# Patient Record
Sex: MALE | Race: WHITE | NOT HISPANIC OR LATINO | Employment: FULL TIME | URBAN - METROPOLITAN AREA
[De-identification: names, ages, dates, MRNs, and addresses within clinical notes are randomized per-mention and may not be internally consistent; named-entity substitution may affect disease eponyms.]

---

## 2017-01-18 ENCOUNTER — COMMUNICATION - HEALTHEAST (OUTPATIENT)
Dept: INTERNAL MEDICINE | Facility: CLINIC | Age: 62
End: 2017-01-18

## 2017-01-19 ENCOUNTER — OFFICE VISIT - HEALTHEAST (OUTPATIENT)
Dept: INTERNAL MEDICINE | Facility: CLINIC | Age: 62
End: 2017-01-19

## 2017-01-19 DIAGNOSIS — K62.5 RECTAL BLEEDING: ICD-10-CM

## 2017-01-19 ASSESSMENT — MIFFLIN-ST. JEOR: SCORE: 1558.86

## 2017-04-07 ENCOUNTER — COMMUNICATION - HEALTHEAST (OUTPATIENT)
Dept: INTERNAL MEDICINE | Facility: CLINIC | Age: 62
End: 2017-04-07

## 2017-10-14 ENCOUNTER — COMMUNICATION - HEALTHEAST (OUTPATIENT)
Dept: INTERNAL MEDICINE | Facility: CLINIC | Age: 62
End: 2017-10-14

## 2017-10-14 DIAGNOSIS — E78.5 HYPERLIPIDEMIA: ICD-10-CM

## 2017-10-24 ENCOUNTER — RECORDS - HEALTHEAST (OUTPATIENT)
Dept: ADMINISTRATIVE | Facility: OTHER | Age: 62
End: 2017-10-24

## 2018-01-08 ENCOUNTER — COMMUNICATION - HEALTHEAST (OUTPATIENT)
Dept: INTERNAL MEDICINE | Facility: CLINIC | Age: 63
End: 2018-01-08

## 2018-01-08 DIAGNOSIS — E78.5 HYPERLIPIDEMIA: ICD-10-CM

## 2018-04-05 ENCOUNTER — COMMUNICATION - HEALTHEAST (OUTPATIENT)
Dept: INTERNAL MEDICINE | Facility: CLINIC | Age: 63
End: 2018-04-05

## 2018-04-05 DIAGNOSIS — E78.5 HYPERLIPIDEMIA: ICD-10-CM

## 2018-06-12 ENCOUNTER — COMMUNICATION - HEALTHEAST (OUTPATIENT)
Dept: INTERNAL MEDICINE | Facility: CLINIC | Age: 63
End: 2018-06-12

## 2018-06-12 DIAGNOSIS — E78.5 HYPERLIPIDEMIA: ICD-10-CM

## 2018-10-06 ENCOUNTER — COMMUNICATION - HEALTHEAST (OUTPATIENT)
Dept: INTERNAL MEDICINE | Facility: CLINIC | Age: 63
End: 2018-10-06

## 2018-10-06 DIAGNOSIS — E78.5 HYPERLIPIDEMIA: ICD-10-CM

## 2018-11-15 ENCOUNTER — OFFICE VISIT - HEALTHEAST (OUTPATIENT)
Dept: INTERNAL MEDICINE | Facility: CLINIC | Age: 63
End: 2018-11-15

## 2018-11-15 DIAGNOSIS — Z00.00 ROUTINE GENERAL MEDICAL EXAMINATION AT A HEALTH CARE FACILITY: ICD-10-CM

## 2018-11-15 DIAGNOSIS — E78.5 HYPERLIPIDEMIA: ICD-10-CM

## 2018-11-15 LAB
ALBUMIN SERPL-MCNC: 4.1 G/DL (ref 3.5–5)
ALBUMIN UR-MCNC: NEGATIVE MG/DL
ALP SERPL-CCNC: 105 U/L (ref 45–120)
ALT SERPL W P-5'-P-CCNC: 26 U/L (ref 0–45)
ANION GAP SERPL CALCULATED.3IONS-SCNC: 9 MMOL/L (ref 5–18)
APPEARANCE UR: CLEAR
AST SERPL W P-5'-P-CCNC: 31 U/L (ref 0–40)
BILIRUB SERPL-MCNC: 1.1 MG/DL (ref 0–1)
BILIRUB UR QL STRIP: NEGATIVE
BUN SERPL-MCNC: 24 MG/DL (ref 8–22)
CALCIUM SERPL-MCNC: 9.4 MG/DL (ref 8.5–10.5)
CHLORIDE BLD-SCNC: 103 MMOL/L (ref 98–107)
CHOLEST SERPL-MCNC: 220 MG/DL
CO2 SERPL-SCNC: 26 MMOL/L (ref 22–31)
COLOR UR AUTO: YELLOW
CREAT SERPL-MCNC: 0.98 MG/DL (ref 0.7–1.3)
ERYTHROCYTE [DISTWIDTH] IN BLOOD BY AUTOMATED COUNT: 11.3 % (ref 11–14.5)
FASTING STATUS PATIENT QL REPORTED: ABNORMAL
GFR SERPL CREATININE-BSD FRML MDRD: >60 ML/MIN/1.73M2
GLUCOSE BLD-MCNC: 94 MG/DL (ref 70–125)
GLUCOSE UR STRIP-MCNC: NEGATIVE MG/DL
HCT VFR BLD AUTO: 49.5 % (ref 40–54)
HDLC SERPL-MCNC: 50 MG/DL
HGB BLD-MCNC: 16.7 G/DL (ref 14–18)
HGB UR QL STRIP: NEGATIVE
KETONES UR STRIP-MCNC: ABNORMAL MG/DL
LDLC SERPL CALC-MCNC: 143 MG/DL
LEUKOCYTE ESTERASE UR QL STRIP: NEGATIVE
MCH RBC QN AUTO: 32 PG (ref 27–34)
MCHC RBC AUTO-ENTMCNC: 33.7 G/DL (ref 32–36)
MCV RBC AUTO: 95 FL (ref 80–100)
NITRATE UR QL: NEGATIVE
PH UR STRIP: 5.5 [PH] (ref 5–8)
PLATELET # BLD AUTO: 209 THOU/UL (ref 140–440)
PMV BLD AUTO: 7 FL (ref 7–10)
POTASSIUM BLD-SCNC: 3.9 MMOL/L (ref 3.5–5)
PROT SERPL-MCNC: 7.6 G/DL (ref 6–8)
PSA SERPL-MCNC: 1.2 NG/ML (ref 0–4.5)
RBC # BLD AUTO: 5.22 MILL/UL (ref 4.4–6.2)
SODIUM SERPL-SCNC: 138 MMOL/L (ref 136–145)
SP GR UR STRIP: >=1.03 (ref 1–1.03)
TRIGL SERPL-MCNC: 137 MG/DL
TSH SERPL DL<=0.005 MIU/L-ACNC: 2.63 UIU/ML (ref 0.3–5)
UROBILINOGEN UR STRIP-ACNC: ABNORMAL
WBC: 6 THOU/UL (ref 4–11)

## 2018-11-15 ASSESSMENT — MIFFLIN-ST. JEOR: SCORE: 1541.28

## 2018-11-16 ENCOUNTER — COMMUNICATION - HEALTHEAST (OUTPATIENT)
Dept: INTERNAL MEDICINE | Facility: CLINIC | Age: 63
End: 2018-11-16

## 2018-11-19 ENCOUNTER — AMBULATORY - HEALTHEAST (OUTPATIENT)
Dept: INTERNAL MEDICINE | Facility: CLINIC | Age: 63
End: 2018-11-19

## 2018-11-19 ENCOUNTER — COMMUNICATION - HEALTHEAST (OUTPATIENT)
Dept: INTERNAL MEDICINE | Facility: CLINIC | Age: 63
End: 2018-11-19

## 2018-11-19 DIAGNOSIS — E78.5 HYPERLIPIDEMIA: ICD-10-CM

## 2019-10-23 ENCOUNTER — COMMUNICATION - HEALTHEAST (OUTPATIENT)
Dept: INTERNAL MEDICINE | Facility: CLINIC | Age: 64
End: 2019-10-23

## 2019-10-31 ENCOUNTER — COMMUNICATION - HEALTHEAST (OUTPATIENT)
Dept: INTERNAL MEDICINE | Facility: CLINIC | Age: 64
End: 2019-10-31

## 2019-10-31 DIAGNOSIS — E78.5 HYPERLIPIDEMIA: ICD-10-CM

## 2019-12-23 ENCOUNTER — OFFICE VISIT - HEALTHEAST (OUTPATIENT)
Dept: INTERNAL MEDICINE | Facility: CLINIC | Age: 64
End: 2019-12-23

## 2019-12-23 DIAGNOSIS — Z00.00 ROUTINE GENERAL MEDICAL EXAMINATION AT A HEALTH CARE FACILITY: ICD-10-CM

## 2019-12-23 LAB
ALBUMIN SERPL-MCNC: 3.8 G/DL (ref 3.5–5)
ALBUMIN UR-MCNC: NEGATIVE MG/DL
ALP SERPL-CCNC: 109 U/L (ref 45–120)
ALT SERPL W P-5'-P-CCNC: 30 U/L (ref 0–45)
ANION GAP SERPL CALCULATED.3IONS-SCNC: 8 MMOL/L (ref 5–18)
APPEARANCE UR: CLEAR
AST SERPL W P-5'-P-CCNC: 26 U/L (ref 0–40)
BILIRUB SERPL-MCNC: 0.6 MG/DL (ref 0–1)
BILIRUB UR QL STRIP: NEGATIVE
BUN SERPL-MCNC: 22 MG/DL (ref 8–22)
CALCIUM SERPL-MCNC: 9.2 MG/DL (ref 8.5–10.5)
CHLORIDE BLD-SCNC: 107 MMOL/L (ref 98–107)
CHOLEST SERPL-MCNC: 164 MG/DL
CO2 SERPL-SCNC: 25 MMOL/L (ref 22–31)
COLOR UR AUTO: YELLOW
CREAT SERPL-MCNC: 1.02 MG/DL (ref 0.7–1.3)
ERYTHROCYTE [DISTWIDTH] IN BLOOD BY AUTOMATED COUNT: 11.2 % (ref 11–14.5)
FASTING STATUS PATIENT QL REPORTED: YES
GFR SERPL CREATININE-BSD FRML MDRD: >60 ML/MIN/1.73M2
GLUCOSE BLD-MCNC: 102 MG/DL (ref 70–125)
GLUCOSE UR STRIP-MCNC: NEGATIVE MG/DL
HCT VFR BLD AUTO: 45.3 % (ref 40–54)
HDLC SERPL-MCNC: 47 MG/DL
HGB BLD-MCNC: 15.7 G/DL (ref 14–18)
HGB UR QL STRIP: NEGATIVE
KETONES UR STRIP-MCNC: NEGATIVE MG/DL
LDLC SERPL CALC-MCNC: 89 MG/DL
LEUKOCYTE ESTERASE UR QL STRIP: NEGATIVE
MCH RBC QN AUTO: 32.4 PG (ref 27–34)
MCHC RBC AUTO-ENTMCNC: 34.7 G/DL (ref 32–36)
MCV RBC AUTO: 93 FL (ref 80–100)
NITRATE UR QL: NEGATIVE
PH UR STRIP: 5 [PH] (ref 5–8)
PLATELET # BLD AUTO: 190 THOU/UL (ref 140–440)
PMV BLD AUTO: 7.2 FL (ref 7–10)
POTASSIUM BLD-SCNC: 4.2 MMOL/L (ref 3.5–5)
PROT SERPL-MCNC: 7 G/DL (ref 6–8)
PSA SERPL-MCNC: 1.6 NG/ML (ref 0–4.5)
RBC # BLD AUTO: 4.85 MILL/UL (ref 4.4–6.2)
SODIUM SERPL-SCNC: 140 MMOL/L (ref 136–145)
SP GR UR STRIP: 1.02 (ref 1–1.03)
TRIGL SERPL-MCNC: 141 MG/DL
TSH SERPL DL<=0.005 MIU/L-ACNC: 2.73 UIU/ML (ref 0.3–5)
UROBILINOGEN UR STRIP-ACNC: NORMAL
WBC: 4.5 THOU/UL (ref 4–11)

## 2019-12-23 ASSESSMENT — MIFFLIN-ST. JEOR: SCORE: 1551.47

## 2019-12-24 ENCOUNTER — COMMUNICATION - HEALTHEAST (OUTPATIENT)
Dept: INTERNAL MEDICINE | Facility: CLINIC | Age: 64
End: 2019-12-24

## 2020-01-22 ENCOUNTER — RECORDS - HEALTHEAST (OUTPATIENT)
Dept: ADMINISTRATIVE | Facility: OTHER | Age: 65
End: 2020-01-22

## 2020-01-30 ENCOUNTER — COMMUNICATION - HEALTHEAST (OUTPATIENT)
Dept: INTERNAL MEDICINE | Facility: CLINIC | Age: 65
End: 2020-01-30

## 2020-01-30 DIAGNOSIS — E78.5 HYPERLIPIDEMIA: ICD-10-CM

## 2020-02-25 ENCOUNTER — RECORDS - HEALTHEAST (OUTPATIENT)
Dept: ADMINISTRATIVE | Facility: OTHER | Age: 65
End: 2020-02-25

## 2020-02-27 ENCOUNTER — RECORDS - HEALTHEAST (OUTPATIENT)
Dept: ADMINISTRATIVE | Facility: OTHER | Age: 65
End: 2020-02-27

## 2020-03-02 ENCOUNTER — RECORDS - HEALTHEAST (OUTPATIENT)
Dept: ADMINISTRATIVE | Facility: OTHER | Age: 65
End: 2020-03-02

## 2020-03-11 ENCOUNTER — COMMUNICATION - HEALTHEAST (OUTPATIENT)
Dept: INTERNAL MEDICINE | Facility: CLINIC | Age: 65
End: 2020-03-11

## 2020-06-18 ENCOUNTER — COMMUNICATION - HEALTHEAST (OUTPATIENT)
Dept: INTERNAL MEDICINE | Facility: CLINIC | Age: 65
End: 2020-06-18

## 2021-01-16 ENCOUNTER — COMMUNICATION - HEALTHEAST (OUTPATIENT)
Dept: INTERNAL MEDICINE | Facility: CLINIC | Age: 66
End: 2021-01-16

## 2021-01-18 ENCOUNTER — OFFICE VISIT - HEALTHEAST (OUTPATIENT)
Dept: INTERNAL MEDICINE | Facility: CLINIC | Age: 66
End: 2021-01-18

## 2021-01-18 DIAGNOSIS — Z00.00 ROUTINE GENERAL MEDICAL EXAMINATION AT A HEALTH CARE FACILITY: ICD-10-CM

## 2021-01-18 DIAGNOSIS — Z23 ENCOUNTER FOR IMMUNIZATION: ICD-10-CM

## 2021-01-18 LAB
ALBUMIN SERPL-MCNC: 4.1 G/DL (ref 3.5–5)
ALBUMIN UR-MCNC: NEGATIVE MG/DL
ALP SERPL-CCNC: 112 U/L (ref 45–120)
ALT SERPL W P-5'-P-CCNC: 33 U/L (ref 0–45)
ANION GAP SERPL CALCULATED.3IONS-SCNC: 9 MMOL/L (ref 5–18)
APPEARANCE UR: CLEAR
AST SERPL W P-5'-P-CCNC: 26 U/L (ref 0–40)
BILIRUB SERPL-MCNC: 0.8 MG/DL (ref 0–1)
BILIRUB UR QL STRIP: NEGATIVE
BUN SERPL-MCNC: 20 MG/DL (ref 8–22)
CALCIUM SERPL-MCNC: 8.9 MG/DL (ref 8.5–10.5)
CHLORIDE BLD-SCNC: 103 MMOL/L (ref 98–107)
CHOLEST SERPL-MCNC: 173 MG/DL
CO2 SERPL-SCNC: 26 MMOL/L (ref 22–31)
COLOR UR AUTO: YELLOW
CREAT SERPL-MCNC: 0.98 MG/DL (ref 0.7–1.3)
ERYTHROCYTE [DISTWIDTH] IN BLOOD BY AUTOMATED COUNT: 11 % (ref 11–14.5)
FASTING STATUS PATIENT QL REPORTED: NORMAL
GFR SERPL CREATININE-BSD FRML MDRD: >60 ML/MIN/1.73M2
GLUCOSE BLD-MCNC: 93 MG/DL (ref 70–125)
GLUCOSE UR STRIP-MCNC: NEGATIVE MG/DL
HCT VFR BLD AUTO: 47.2 % (ref 40–54)
HDLC SERPL-MCNC: 45 MG/DL
HGB BLD-MCNC: 16.3 G/DL (ref 14–18)
HGB UR QL STRIP: NEGATIVE
KETONES UR STRIP-MCNC: NEGATIVE MG/DL
LDLC SERPL CALC-MCNC: 101 MG/DL
LEUKOCYTE ESTERASE UR QL STRIP: NEGATIVE
MCH RBC QN AUTO: 32.7 PG (ref 27–34)
MCHC RBC AUTO-ENTMCNC: 34.6 G/DL (ref 32–36)
MCV RBC AUTO: 95 FL (ref 80–100)
NITRATE UR QL: NEGATIVE
PH UR STRIP: 5.5 [PH] (ref 5–8)
PLATELET # BLD AUTO: 182 THOU/UL (ref 140–440)
PMV BLD AUTO: 7 FL (ref 7–10)
POTASSIUM BLD-SCNC: 4.2 MMOL/L (ref 3.5–5)
PROT SERPL-MCNC: 6.9 G/DL (ref 6–8)
PSA SERPL-MCNC: 1.5 NG/ML (ref 0–4.5)
RBC # BLD AUTO: 4.99 MILL/UL (ref 4.4–6.2)
SODIUM SERPL-SCNC: 138 MMOL/L (ref 136–145)
SP GR UR STRIP: <=1.005 (ref 1–1.03)
TRIGL SERPL-MCNC: 137 MG/DL
TSH SERPL DL<=0.005 MIU/L-ACNC: 3.29 UIU/ML (ref 0.3–5)
UROBILINOGEN UR STRIP-ACNC: NORMAL
WBC: 5.6 THOU/UL (ref 4–11)

## 2021-01-18 ASSESSMENT — MIFFLIN-ST. JEOR: SCORE: 1555.46

## 2021-01-19 ENCOUNTER — COMMUNICATION - HEALTHEAST (OUTPATIENT)
Dept: INTERNAL MEDICINE | Facility: CLINIC | Age: 66
End: 2021-01-19

## 2021-01-21 ENCOUNTER — OFFICE VISIT - HEALTHEAST (OUTPATIENT)
Dept: INTERNAL MEDICINE | Facility: CLINIC | Age: 66
End: 2021-01-21

## 2021-01-21 DIAGNOSIS — U07.1 2019 NOVEL CORONAVIRUS DISEASE (COVID-19): ICD-10-CM

## 2021-02-05 ENCOUNTER — COMMUNICATION - HEALTHEAST (OUTPATIENT)
Dept: ADMINISTRATIVE | Facility: CLINIC | Age: 66
End: 2021-02-05

## 2021-02-05 DIAGNOSIS — E78.5 HYPERLIPIDEMIA: ICD-10-CM

## 2021-03-07 ENCOUNTER — COMMUNICATION - HEALTHEAST (OUTPATIENT)
Dept: INTERNAL MEDICINE | Facility: CLINIC | Age: 66
End: 2021-03-07

## 2021-03-25 ENCOUNTER — COMMUNICATION - HEALTHEAST (OUTPATIENT)
Dept: SCHEDULING | Facility: CLINIC | Age: 66
End: 2021-03-25

## 2021-03-25 ENCOUNTER — NURSE TRIAGE (OUTPATIENT)
Dept: NURSING | Facility: CLINIC | Age: 66
End: 2021-03-25

## 2021-04-23 ENCOUNTER — COMMUNICATION - HEALTHEAST (OUTPATIENT)
Dept: INTERNAL MEDICINE | Facility: CLINIC | Age: 66
End: 2021-04-23

## 2021-05-25 ENCOUNTER — RECORDS - HEALTHEAST (OUTPATIENT)
Dept: ADMINISTRATIVE | Facility: CLINIC | Age: 66
End: 2021-05-25

## 2021-05-30 VITALS — BODY MASS INDEX: 29.41 KG/M2 | WEIGHT: 183 LBS | HEIGHT: 66 IN

## 2021-06-02 VITALS — HEIGHT: 66 IN | BODY MASS INDEX: 28.93 KG/M2 | WEIGHT: 180 LBS

## 2021-06-02 NOTE — TELEPHONE ENCOUNTER
Refill Approved    Rx renewed per Medication Renewal Policy. Medication was last renewed on 11/16/18.    Latisha Duff, [facility] Triage/Med Refill 10/31/2019     Requested Prescriptions   Pending Prescriptions Disp Refills     atorvastatin (LIPITOR) 20 MG tablet [Pharmacy Med Name: ATORVASTATIN 20 MG TABLET] 90 tablet 4     Sig: TAKE 1 TABLET BY MOUTH EVERY DAY       Statins Refill Protocol (Hmg CoA Reductase Inhibitors) Passed - 10/31/2019  4:29 AM        Passed - PCP or prescribing provider visit in past 12 months      Last office visit with prescriber/PCP: 1/19/2017 Quincy Mora MD OR same dept: Visit date not found OR same specialty: 1/19/2017 Quincy Mora MD  Last physical: 11/15/2018 Last MTM visit: Visit date not found   Next visit within 3 mo: Visit date not found  Next physical within 3 mo: Visit date not found  Prescriber OR PCP: Quincy Mora MD  Last diagnosis associated with med order: There are no diagnoses linked to this encounter.  If protocol passes may refill for 12 months if within 3 months of last provider visit (or a total of 15 months).

## 2021-06-04 VITALS
WEIGHT: 183.12 LBS | SYSTOLIC BLOOD PRESSURE: 108 MMHG | HEART RATE: 62 BPM | DIASTOLIC BLOOD PRESSURE: 74 MMHG | OXYGEN SATURATION: 97 % | HEIGHT: 65 IN | BODY MASS INDEX: 30.51 KG/M2

## 2021-06-04 NOTE — PROGRESS NOTES
Office Visit - Physical    Donavon Stein   64 y.o. male    Date of Visit: 12/23/2019    Chief Complaint   Patient presents with     Annual Exam     fasting       Subjective: Sickle done.    64-year-old male with physical examination.    Right hip pain 2 to 3 months impaired motion in the right hip and positive Juan Jose's test.    Suggest orthopedic consultation with Dr. Rafael Suárez or associate.    Pain with walking.    Flex sig examination done February 24, 2015 showed normal findings contrast barium enema to follow-up.    Non-smoker and no excess alcohol.    History of seborrheic keratoses.    Metamucil has been advised very minimal was negative on February 24, 2015.    Pepcid AC for heartburn recommended previously.  Plus Metamucil for irritable bowel syndrome.  Flu vaccine has been discussed previously.    Allergy penicillin and seafood.    Non-smoker and no excess alcohol.  Retired now from [employer].  Immunizations reviewed and up-to-date.    ROS: A comprehensive review of systems was performed and was otherwise negative    Medications:   Prior to Admission medications    Medication Sig Start Date End Date Taking? Authorizing Provider   aspirin 81 MG EC tablet Take 81 mg by mouth daily.   Yes PROVIDER, HISTORICAL   atorvastatin (LIPITOR) 20 MG tablet TAKE 1 TABLET BY MOUTH EVERY DAY 10/31/19  Yes Quincy Mora MD   triamcinolone (KENALOG) 0.1 % ointment Thin layer daily 1/19/17  Yes Quincy Mora MD       Allergies:  Allergies   Allergen Reactions     Penicillins      Seafood Hives       Immunizations:   Immunization History   Administered Date(s) Administered     Influenza, inj, historic,unspecified 11/06/2009     Td,adult,historic,unspecified 01/27/2006     Tdap 12/08/2015       Health Maintenance: Immunizations reviewed and up-to-date.  Non-smoker no excess alcohol.  No known drug allergies but for penicillin.  Hyperlipidemia by history.    Past Medical History: Bilateral groin hernia repair and  wisdom tooth extraction at [facility].  Prior history of left clavicular fracture treated conservatively.    Past Surgical History: See above.  No prior problems with anesthesia.  Suggest colonoscopy to be done instead of flex sig barium enema.    Family History: Patient is adopted birth mother had hypertension type 2 diabetes and a pacemaker  in her sleep age 89.    1 maternal uncle had a heart attack at age 53.  Maternal sister  of Lewy body dementia age 82 1 older half brother has Parkinson's disease and dementia associated with it.  The patient is  2 daughters.  Previously  and .    Social History: Retired from [employer] works out regularly lifting weights.  Lifts weights on a daily basis.    Exam Chest clear to auscultation and percussion.  Heart tones regular rhythm without murmur rub or gallop.  Abdomen soft nontender no organomegaly.  No peritoneal signs.  Extremities free of edema cyanosis or clubbing.  Neck veins nondistended no thyromegaly or scleral icterus noted, carotids full.  Skin warm and dry easily conversant good spirited.  Normal intelligence.  Neurologically intact no gross localizing findings.  Skin negative lymph negative neuro negative psych normal HEENT negative back straight no severe spine tenderness excellent neuromuscular tone good pulse noted all 4 extremities no carotid bruits or thyromegaly.  No lymphadenopathy appreciated lymph bearing areas.  Genital rectal examination negative except prostate minimally enlarged at 1/4 without nodularity or induration nothing to suggest malignancy.  Rest of the rectal exam negative.    Assessment and Plan  General medical examination at healthcare facility check hemogram plus comprehensive metabolic profile urinalysis lipid panel PSA TSH.    Right hip pain with restricted range of motion positive Juan Jose's test.  Suggest orthopedic specialty consultation.  With Dr. Shailesh August at [place] or Dr. Feliciano Avila at  [facility]    The following high BMI interventions were performed this visit: encouragement to exercise    Quincy Mora MD    Patient Active Problem List   Diagnosis     Benign Prostatic Hypertrophy     Atypical Chest Pain     Penicillin Reaction

## 2021-06-04 NOTE — TELEPHONE ENCOUNTER
[phone] (home)        [facility] called and informed pt of results.  Patient wished JESUS Rice and thanked for call. Sailaja Cordon CMA ([facility]) 10:25 AM

## 2021-06-05 VITALS
HEIGHT: 65 IN | SYSTOLIC BLOOD PRESSURE: 110 MMHG | DIASTOLIC BLOOD PRESSURE: 68 MMHG | BODY MASS INDEX: 30.66 KG/M2 | TEMPERATURE: 97 F | HEART RATE: 68 BPM | WEIGHT: 184 LBS | OXYGEN SATURATION: 99 %

## 2021-06-05 NOTE — TELEPHONE ENCOUNTER
Refill Approved    Rx renewed per Medication Renewal Policy. Medication was last renewed on 10/31/19.    Latisha Duff, [facility] Triage/Med Refill 1/30/2020     Requested Prescriptions   Pending Prescriptions Disp Refills     atorvastatin (LIPITOR) 20 MG tablet [Pharmacy Med Name: ATORVASTATIN 20 MG TABLET] 90 tablet 0     Sig: TAKE 1 TABLET BY MOUTH EVERY DAY       Statins Refill Protocol (Hmg CoA Reductase Inhibitors) Passed - 1/30/2020  2:16 AM        Passed - PCP or prescribing provider visit in past 12 months      Last office visit with prescriber/PCP: 1/19/2017 Quincy Mora MD OR same dept: Visit date not found OR same specialty: 1/19/2017 Quincy Mora MD  Last physical: 12/23/2019 Last MTM visit: Visit date not found   Next visit within 3 mo: Visit date not found  Next physical within 3 mo: Visit date not found  Prescriber OR PCP: Quincy Mora MD  Last diagnosis associated with med order: 1. Hyperlipidemia  - atorvastatin (LIPITOR) 20 MG tablet [Pharmacy Med Name: ATORVASTATIN 20 MG TABLET]; TAKE 1 TABLET BY MOUTH EVERY DAY  Dispense: 90 tablet; Refill: 0    If protocol passes may refill for 12 months if within 3 months of last provider visit (or a total of 15 months).

## 2021-06-08 NOTE — PROGRESS NOTES
Office Visit - Follow up    Donavon Stein   61 y.o. male    Date of Visit: 1/19/2017    Chief Complaint   Patient presents with     Rectal Bleeding       Subjective: Rectal bleeding.  Bright red blood around the stool not mixed in.  2-3 bowel movements per day.  The patient had pain with the bowel movement as well.  We did discuss hemorrhoids prior history of same last colonoscopy or flex sig exam done with CT clog her feet for very 2015 by Dr. Rocael Castillo all clear no sign of cancer.  The patient previously had had colonoscopy or colon exam every 5 years originally with Dr. Mack later with Dr. Bustos.    The patient is adopted there is no family history of cancer in his biologic mother the eye logic status of his birth father is unknown.    No locking urine blood and stool is bright red.  There is painful defecation.    Medication list reviewed generally well-tolerated.    ROS: A comprehensive review of systems was performed and was otherwise negative    Medications:  Prior to Admission medications    Medication Sig Start Date End Date Taking? Authorizing Provider   pravastatin (PRAVACHOL) 20 MG tablet TAKE 1 TABLET BY MOUTH EVERY DAY 12/20/16  Yes Quincy Mora MD   triamcinolone (KENALOG) 0.1 % ointment Thin layer daily 1/19/17  Yes Quincy Mora MD   triamcinolone (KENALOG) 0.1 % ointment Apply topically 2 (two) times a day.  1/19/17 Yes PROVIDER, HISTORICAL       Allergies:   Allergies   Allergen Reactions     Penicillins        Immunizations:   Immunization History   Administered Date(s) Administered     Influenza, inj, historic 11/06/2009     Td, historic 01/27/2006     Tdap 12/08/2015       Exam Chest clear to auscultation and percussion.  Heart tones regular rhythm without murmur rub or gallop.  Abdomen soft nontender no organomegaly.  No peritoneal signs.  Extremities free of edema cyanosis or clubbing.  Neck veins nondistended no thyromegaly or scleral icterus noted, carotids full.  Skin  warm and dry easily conversant good spirited.  Normal intelligence.  Neurologically intact no gross localizing findings.  Rectal examination was unremarkable except for 3 hemorrhoidal tase externally there was no rectal masses the prostate was slightly generous no nodularity.    Assessment and Plan   Rectal bleeding probably hemorrhoidal or perianal irritation patient reassured high-fiber diet and Metamucil suggested with plenty of fluids.  If more of a problem suggest formal colonoscopy with colorectal surgeon Dr. Rocael Castillo.    BPH.    Hyperlipidemia on statin therapy.    Rash continue triamcinolone Kenalog ointment 0.1% refill done.    Time: total time spent with the patient was 25 minutes of which >50% was spent in counseling and coordination of care    The following high BMI interventions were performed this visit: encouragement to exercise    Quincy Mora MD    Patient Active Problem List   Diagnosis     Benign Prostatic Hypertrophy     Atypical Chest Pain     Penicillin Reaction

## 2021-06-14 NOTE — PROGRESS NOTES
"Donavon Stein is a 65 y.o. male who is being evaluated via a billable telephone visit.      What phone number would you like to be contacted at? [phone]  How would you like to obtain your AVS? AVS Preference: [facility].  Assessment & Plan     Covid 19 infection recently tested positive.  Minor symptoms of aches myalgias arthralgias slight cough and sore throat.    Temperature 97.7 O2 sats 96%.  Recent flu vaccine preceded.    Discussed in length at length suggest quarantine 14 days.  Wife should also quarantine no children live with him.  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    Review of external notes as documented above           11 minutes spent on the date of the encounter doing chart review, patient visit and documentation          BMI:   Estimated body mass index is 30.39 kg/m  as calculated from the following:    Height as of 1/18/21: 5' 5.25\" (1.657 m).    Weight as of 1/18/21: 184 lb (83.5 kg).   I have had an Advance Directives discussion with the patient.      No follow-ups on file.    Quincy Mora MD  [facility]     Donavon Stein is 65 y.o. and presents to clinic today for the following health issues   HPI             Review of Systems  No blood in stool or urine no chest pain shortness of breath generally feels quite well only minimally sick see above.  Medication list reviewed reconciled in the chart.  Non-smoker no excess alcohol.  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      Objective    Vitals - Patient Reported  SpO2 (Patient Reported): 96  Temperature (Patient Reported): 97.7  F (36.5  C)    Physical Exam  No physical examination was done as this was a telephone visit.    See above temperature 97.7 O2 sats 96%.            Phone call duration: 11 [id number]  "

## 2021-06-14 NOTE — PROGRESS NOTES
Office Visit - Physical    Donavon Stein   65 y.o. male    Date of Visit: 2021    Chief Complaint   Patient presents with     Annual Exam     Physical Exam   fasting       Subjective: Physical examination.    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    Non-smoker 1 beer per month.  Allergy penicillin.  The patient is adopted the patient's mother's health includes that she  within the last year in her 90s.  The father's health biologic father is unknown.    ROS: A comprehensive review of systems was performed and was otherwise negative    Medications:   Prior to Admission medications    Medication Sig Start Date End Date Taking? Authorizing Provider   aspirin 81 MG EC tablet Take 81 mg by mouth daily.   Yes PROVIDER, HISTORICAL   atorvastatin (LIPITOR) 20 MG tablet TAKE 1 TABLET BY MOUTH EVERY DAY 20  Yes Quincy Mora MD   triamcinolone (KENALOG) 0.1 % ointment Thin layer daily 17   Quincy Mora MD       Allergies:  Allergies   Allergen Reactions     Penicillins      Seafood Hives       Immunizations:   Immunization History   Administered Date(s) Administered     Influenza, inj, historic,unspecified 2009     Td,adult,historic,unspecified 2006     Tdap 2015       Health Maintenance: Immunizations reviewed and up-to-date.    Flu vaccine recommended along with the shingles vaccine but not at the same time.    Colonoscopy dated 2020 showed a tubular adenomatous colon polyp and background diverticulosis.    Allergy penicillin and seafood.  Hives for both.    Past Medical History: Hyperlipidemia without target organ damage related to same.    Sciatica right side improving.  Benign prostatic hypertrophy without surgical treatment.  Right rotator cuff tear or injury after falling off a ladder last summer.  Improving with time.  Suggest orthopedic consultation with shoulder expert Dr. Jonas Carlos.  Right-hand-dominant it is right  shoulder.    Past Surgical History: Bilateral inguinal hernia repair .    [place] tooth extraction in .    Family History: Mother  in her 90s.  The patient is adopted.  2 children 1 daughter and 1 son both well.  Patient has been  once  once.    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    Exam Chest clear to auscultation and percussion.  Heart tones regular rhythm without murmur rub or gallop.  Abdomen soft nontender no organomegaly.  No peritoneal signs.  Extremities free of edema cyanosis or clubbing.  Neck veins nondistended no thyromegaly or scleral icterus noted, carotids full.  Skin warm and dry easily conversant good spirited.  Normal intelligence.  Neurologically intact no gross localizing findings.  Rest of exam negative.  Skin negative lymph negative neuro negative psych normal HEENT negative appears younger than stated age.  Exercises regularly by lifting weights neuromuscular tone is good good pulse noted in all 4 extremities no carotid bruits or thyromegaly genital rectal exam negative prostate was slightly enlarged at 1+/4 without nodularity induration smooth is the prostate gland rest of examination negative.  Mild centripetal obesity noted.    65-1/4 inches tall 184 pounds up 1 pound from last visit BMI 30.4    110/68 pulse 68 respirations 18 O2 sats room air 99% temperature this morning 97 degrees.    Assessment and Plan  General medical examination at health care facility.  Colonoscopy up-to-date suggest periodic colonoscopies with history of tubular adenomatous colon polyp.  If chest pain or shortness of breath should occur would recommend exercise treadmill testing.  Today hemogram comprehensive metabolic profile urinalysis lipid panel PSA TSH.  Weight loss is advisable see below.    The following high BMI interventions were performed this visit: encouragement to exercise    Quincy Mora MD    Patient Active Problem List   Diagnosis     Benign Prostatic Hypertrophy      Atypical Chest Pain     Penicillin Reaction

## 2021-06-15 NOTE — TELEPHONE ENCOUNTER
RN cannot approve Refill Request    RN can NOT refill this medication med is not covered by policy/route to provider. Last office visit: 1/19/2017 Quincy Mora MD Last Physical: 1/18/2021 Last MTM visit: Visit date not found Last visit same specialty: Visit date not found.  Next visit within 3 mo: Visit date not found  Next physical within 3 mo: Visit date not found      Lorna Reno, [facility] Triage/Med Refill 2/5/2021    Requested Prescriptions   Pending Prescriptions Disp Refills     atorvastatin (LIPITOR) 20 MG tablet 90 tablet 3     Sig: Take 1 tablet (20 mg total) by mouth daily.       There is no refill protocol information for this order

## 2021-06-15 NOTE — TELEPHONE ENCOUNTER
Reason for Call:  Medication or medication refill:    Do you use a [facility]?  Name of the pharmacy and phone number for the current request: [facility] on file    Name of the medication requested:     Atorvastatin 20 mg    Other request: Patient reporting he has only 2 pills left. Equal to 2 days remaining.      Can we leave a detailed message on this number? Yes    Phone number patient can be reached at: Home number on file [phone] (home) and Work number on file:  There is no work phone number on file.    Best Time: Any time    Call taken on 2/5/2021 at 10:48 AM by Isiah Nicole

## 2021-06-16 NOTE — TELEPHONE ENCOUNTER
Donavon tested positive for Corona in January.  Donavon is scheduled to have Moderna vaccine this Saturday.  Donavon is wanting to know if it is alright to get vaccine after having covid in January.  Donavon says that he is about 70 days out after having covid.  Donavon is under the impression that he needs to wait 90 days.  Donavon is requesting to speak with MD Mora.  Please phone Donavon.    COVID 19 Nurse Triage Plan/Patient Instructions    Please be aware that novel coronavirus (COVID-19) may be circulating in the community. If you develop symptoms such as fever, cough, or SOB or if you have concerns about the presence of another infection including coronavirus (COVID-19), please contact your health care provider or visit  [email or web address].    Disposition/Instructions    Home care recommended. Follow home care protocol based instructions.    Thank you for taking steps to prevent the spread of this virus.  o Limit your contact with others.  o Wear a simple mask to cover your cough.  o Wash your hands well and often.    Resources    [facility]: About COVID-19: [email or web address]    CDC: What to Do If You're Sick: www.cdc.gov/coronavirus/2019-ncov/about/steps-when-sick.html    CDC: Ending Home Isolation: www.cdc.gov/coronavirus/2019-ncov/hcp/disposition-in-home-patients.html     CDC: Caring for Someone: www.cdc.gov/coronavirus/2019-ncov/if-you-are-sick/care-for-someone.html     [facility]: Interim Guidance for Hospital Discharge to Home: www.health.[facility].[place]/diseases/coronavirus/hcp/hospdischarge.pdf    [facility] clinical trials (COVID-19 research studies): [email or web address]     Below are the COVID-19 hotlines at the [place] Department of Health ([facility]). Interpreters are available.   o For health questions: Call 865-502-3259 or 1-727.901.8425 (7 a.m. to 7 p.m.)  o For questions about schools and childcare: Call 883-886-2159 or 1-156.727.7692 (7 a.m. to 7 p.m.)      Reason for Disposition    COVID-19 vaccine, Frequently Asked Questions (FAQs)    Additional Information    Negative: [1] Difficulty breathing or swallowing AND [2] starts within 2 hours after injection    Negative: Sounds like a life-threatening emergency to the triager    Negative: Fever > 104 F (40 C)    Negative: Sounds like a severe, unusual reaction to the triager    Negative: [1] Redness or red streak around the injection site AND [2] started > 48 hours after getting vaccine AND [3] fever    Negative: [1] Fever > 101 F (38.3 C) AND [2] age > 60 AND [3] started > 48 hours after getting vaccine    Negative: [1] Fever > 100.0 F (37.8 C) AND [2] bedridden (e.g., nursing home patient, CVA, chronic illness, recovering from surgery) AND [3] started > 48 hours after getting vaccine    Negative: [1] Fever > 100.0 F (37.8 C) AND [2] diabetes mellitus or weak immune system (e.g., HIV positive, cancer chemo, splenectomy, organ transplant, chronic steroids) AND [3] started > 48 hours after getting vaccine    Negative: [1] Redness or red streak around the injection site AND [2] started > 48 hours after getting vaccine AND [3] no fever  (Exception: red area < 1 inch or 2.5 cm wide)    Negative: [1] Pain, tenderness, or swelling at the injection site AND [2] over 3 days (72 hours) since vaccine AND [3] getting worse    Negative: Fever > 100.0 F (37.8 C) present > 3 days (72 hours)    Negative: [1] Fever > 100.0 F (37.8 C) AND [2] healthcare worker    Negative: [1] Pain, tenderness, or swelling at the injection site AND [2] lasts > 7 days    Negative: [1] Requesting COVID-19 vaccine AND [2] healthcare worker (e.g., EMS first responders, doctors, nurses)    Negative: [1] Requesting COVID-19 vaccine AND [2] resident of a long-term care facility (e.g., nursing home)    Negative: [1] Requesting COVID-19 vaccine AND [2] vaccine available in the community for this patient group    Negative: COVID-19 vaccine, injection site  reaction (e.g., pain, redness, swelling), question about    Negative: COVID-19 vaccine, systemic reactions (e.g., fatigue, fever, muscle aches), questions about    Protocols used: CORONAVIRUS (COVID-19) VACCINE QUESTIONS AND [id number] 1.3.21

## 2021-06-21 NOTE — PROGRESS NOTES
Physical exam    Donavon Stein   63 y.o. male    Date of Visit: 11/15/2018    Chief Complaint   Patient presents with     Annual Exam     Physical Exam    fasting       Subjective: Physical examination.    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    Periodic right-sided jaw pain.  Has been seen by dentist and x-rays done negative.  Increasing size of lipoma near her right biceps medial aspect suggest general surgery consultation with Dr. Issac Medina at 605.  G2-2. [id number].    Seborrheic keratosis small right anterior thigh reassured.    Rectal bleeding on occasion rarely.  Better while on Metamucil although the latter may have exacerbated acid reflux.  Barium enema negative February 24, 2015.  Suggest colonoscopy flex sig barium enema as per the recommendation of Dr. Armstrong with colorectal surgery group.    Pepcid AC for heartburn exacerbated by Metamucil.  Another option would be to stop Metamucil.    Air contrast barium enema study negative February 24, 2015.  Discussed flu vaccine.    Allergy penicillin and seafood.    Non-smoker no excess alcohol.  Retired now from [employer].    Immunizations reviewed and up-to-date.    Non-smoker 2-3 beers per month with no known drug allergies.    Penicillin allergy per his mother's report.    Hyperlipidemia by history.    Bilateral groin hernia repair and wisdom tooth extraction at the [facility].    Prior history of left clavicular fracture treated conservatively.    Family history patient is adopted.  Mother of patient is in her 80s or 90s status of her health unknown.  The biologic father status unknown.  The patient himself is  father to 2 children one having graduated from [employer] in engineering  and the second a senior at [employer].    Tetanus and diphtheria booster given with last visit as noted with physical examination of February 8, 2015.    ROS: A comprehensive review of systems was performed and  was otherwise negative    Medications:  Prior to Admission medications    Medication Sig Start Date End Date Taking? Authorizing Provider   triamcinolone (KENALOG) 0.1 % ointment Thin layer daily 1/19/17  Yes Quincy Mora MD   pravastatin (PRAVACHOL) 20 MG tablet Take 1 tablet (20 mg total) by mouth daily. 11/15/18   Quincy Mora MD   pravastatin (PRAVACHOL) 20 MG tablet TAKE 1 TABLET BY MOUTH EVERY DAY 10/6/18 11/15/18  Quincy Mora MD       Allergies:   Allergies   Allergen Reactions     Penicillins      Seafood Hives       Immunizations:   Immunization History   Administered Date(s) Administered     Influenza, inj, historic,unspecified 11/06/2009     Td,adult,historic,unspecified 01/27/2006     Tdap 12/08/2015       Exam Chest clear to auscultation and percussion.  Heart tones regular rhythm without murmur rub or gallop.  Abdomen soft nontender no organomegaly.  No peritoneal signs.  Extremities free of edema cyanosis or clubbing.  Neck veins nondistended no thyromegaly or scleral icterus noted, carotids full.  Skin warm and dry easily conversant good spirited.  Normal intelligence.  Neurologically intact no gross localizing findings.  Skin negative lymph negative neuro negative psych normal HEENT negative back straight no severe spine tenderness genital rectal examination negative small prostate without nodularity or induration nothing to suggest prostatic malignancy no rectal masses HEENT negative neck negative no thyromegaly or carotid bruits back straight no severe spine tenderness noted weight down 3 pounds other vital signs are stable he is not in acute distress easily conversant no groin hernias no testicular masses good pulses noted in all 4 extremities no carotid bruits no lymph no no lymphadenopathy appreciated in bearing areas.    Assessment and Plan  General medical examination on otherwise healthy 63-year-old male now retired from [employer] history of hyperlipidemia [place] also history  of rectal bleeding rarely could consider continuation of Metamucil and colorectal surgery reevaluation with flex sig colonoscopy and/or repeat air contrast barium enema last air contrast barium enema done February 24, 2015 allCLEAR.    Right jaw pain uncertain etiology.    As part of the evaluation also noted lipoma right biceps inner most medial aspect suggest general surgery consultation.    Keratosis seborrheic right anterior thigh small.    Heartburn may be help with Pepcid AC 20 mg twice a day.    Penicillin and seafood allergy.    Overweight BMI 29.50 see below.    As part of the comprehensive physical examination will check hemogram comprehensive metabolic profile urinalysis and PSA TSH lipid panel.  Colonoscopy should be done if rectal bleeding should persist and/or exercise treadmill test is recommended if chest pain or shortness of breath occurs or syncope related to exertional episodes.    Time: total time spent with the patient was 40 minutes of which >50% was spent in counseling and coordination of care    The following high BMI interventions were performed this visit: encouragement to exercise    Quincy Mora MD    Patient Active Problem List   Diagnosis     Benign Prostatic Hypertrophy     Atypical Chest Pain     Penicillin Reaction

## 2021-06-21 NOTE — LETTER
Letter by Quincy Mora MD at      Author: Quincy Mora MD Service: -- Author Type: --    Filed:  Encounter Date: 1/19/2021 Status: (Other)         Donavon Stein  [address] [place] [address]             January 19, 2021         Dear Mr. Stein,    Below are the results from your recent visit:    Resulted Orders   HM2(CBC w/o Differential)   Result Value Ref Range    WBC 5.6 4.0 - 11.0 thou/uL    RBC 4.99 4.40 - 6.20 mill/uL    Hemoglobin 16.3 14.0 - 18.0 g/dL    Hematocrit 47.2 40.0 - 54.0 %    MCV 95 80 - 100 fL    MCH 32.7 27.0 - 34.0 pg    MCHC 34.6 32.0 - 36.0 g/dL    RDW 11.0 11.0 - 14.5 %    Platelets 182 140 - 440 thou/uL    MPV 7.0 7.0 - 10.0 fL   Comprehensive Metabolic Panel   Result Value Ref Range    Sodium 138 136 - 145 mmol/L    Potassium 4.2 3.5 - 5.0 mmol/L    Chloride 103 98 - 107 mmol/L    CO2 26 22 - 31 mmol/L    Anion Gap, Calculation 9 5 - 18 mmol/L    Glucose 93 70 - 125 mg/dL    BUN 20 8 - 22 mg/dL    Creatinine 0.98 0.70 - 1.30 mg/dL    GFR MDRD Af Amer >60 >60 mL/min/1.73m2    GFR MDRD Non Af Amer >60 >60 mL/min/1.73m2    Bilirubin, Total 0.8 0.0 - 1.0 mg/dL    Calcium 8.9 8.5 - 10.5 mg/dL    Protein, Total 6.9 6.0 - 8.0 g/dL    Albumin 4.1 3.5 - 5.0 g/dL    Alkaline Phosphatase 112 45 - 120 U/L    AST 26 0 - 40 U/L    ALT 33 0 - 45 U/L    Narrative    Fasting Glucose reference range is 70-99 mg/dL per  American Diabetes Association (ADA) guidelines.   Lipid Cascade   Result Value Ref Range    Cholesterol 173 <=199 mg/dL    Triglycerides 137 <=149 mg/dL    HDL Cholesterol 45 >=40 mg/dL    LDL Calculated 101 <=129 mg/dL    Patient Fasting > 8hrs? Unknown    Thyroid Stimulating Hormone (TSH)   Result Value Ref Range    TSH 3.29 0.30 - 5.00 uIU/mL   Urinalysis-UC if Indicated   Result Value Ref Range    Color, UA Yellow Colorless, Yellow, Straw, Light Yellow    Clarity, UA Clear Clear    Glucose, UA Negative Negative    Bilirubin, UA Negative Negative    Ketones, UA Negative  Negative    Specific Gravity, UA <=1.005 1.005 - 1.030    Blood, UA Negative Negative    pH, UA 5.5 5.0 - 8.0    Protein, UA Negative Negative mg/dL    Urobilinogen, UA 0.2 E.U./dL 0.2 E.U./dL, 1.0 E.U./dL    Nitrite, UA Negative Negative    Leukocytes, UA Negative Negative    Narrative    Microscopic not indicated  UC not indicated   PSA (Prostatic-Specific Antigen), Annual Screen   Result Value Ref Range    PSA 1.5 0.0 - 4.5 ng/mL    Narrative    Method is Abbott Prostate-Specific Antigen (PSA)  Standard-WHO 1st International (90:10)       All very good results    Please call with questions or contact us using Dragonplayt.    Sincerely,        Electronically signed by Quincy Mora MD

## 2021-07-04 ENCOUNTER — HEALTH MAINTENANCE LETTER (OUTPATIENT)
Age: 66
End: 2021-07-04

## 2021-09-08 ENCOUNTER — MYC MEDICAL ADVICE (OUTPATIENT)
Dept: INTERNAL MEDICINE | Facility: CLINIC | Age: 66
End: 2021-09-08

## 2021-09-08 DIAGNOSIS — Z12.11 SCREENING FOR MALIGNANT NEOPLASM OF COLON: Primary | ICD-10-CM

## 2021-09-08 DIAGNOSIS — K92.1 HEMATOCHEZIA: ICD-10-CM

## 2021-09-14 ENCOUNTER — TELEPHONE (OUTPATIENT)
Dept: INTERNAL MEDICINE | Facility: CLINIC | Age: 66
End: 2021-09-14

## 2021-09-14 NOTE — TELEPHONE ENCOUNTER
Patient called [facility] to schedule appt.    Emilee Andrade from [facility] is calling for recent chart notes from PCP.    Fax number is

## 2021-09-30 NOTE — TELEPHONE ENCOUNTER
Reason for Call: Request for an order or referral: [facility] Requesting order/referral    Order or referral being requested: Colonoscopy    Date needed: as soon as feasible as [facility] has scheduled patient - just need referral    Has the patient been seen by the PCP for this problem? Yes    Additional comments: Pt reviewed with PCP in MyChart - Epic does not reflect order/referral being placed.      Call taken on 9/30/2021 at 11:13 AM by Latisha Rothman

## 2021-09-30 NOTE — TELEPHONE ENCOUNTER
Patient notified of referral through [employer].  Faxed the referral.    Reyes Horowitz MD  General Internal Medicine  [facility]  9/30/2021, 12:44 PM

## 2021-10-20 ENCOUNTER — TRANSFERRED RECORDS (OUTPATIENT)
Dept: HEALTH INFORMATION MANAGEMENT | Facility: CLINIC | Age: 66
End: 2021-10-20
Payer: COMMERCIAL

## 2021-10-24 ENCOUNTER — HEALTH MAINTENANCE LETTER (OUTPATIENT)
Age: 66
End: 2021-10-24

## 2022-02-02 DIAGNOSIS — E78.5 HYPERLIPIDEMIA, UNSPECIFIED: ICD-10-CM

## 2022-02-04 RX ORDER — ATORVASTATIN CALCIUM 20 MG/1
TABLET, FILM COATED ORAL
Qty: 90 TABLET | Refills: 3 | Status: SHIPPED | OUTPATIENT
Start: 2022-02-04 | End: 2023-01-27

## 2022-02-04 NOTE — TELEPHONE ENCOUNTER
"  Outpatient Medication Detail     Disp Refills Start End ZINA   atorvastatin (LIPITOR) 20 MG tablet 90 tablet 3 2/5/2021  No   Sig - Route: Take 1 tablet (20 mg total) by mouth daily. - Oral   Sent to pharmacy as: atorvastatin 20 mg tablet (LIPITOR)   E-Prescribing Status: Receipt confirmed by pharmacy (2/5/2021 12:43 PM CST)       atorvastatin (LIPITOR) 20 MG tablet [[id number]]    Electronically signed by: Quincy Mora MD on 02/05/21 1243 Status: Active   Ordering user: Quincy Mora MD 02/05/21 1243 Authorized by: Quincy Mora MD   Frequency: DAILY 02/05/21 - Until Discontinued Released by: Quincy Mora MD 02/05/21 1243   Diagnoses  Hyperlipidemia [E78.5]     Routing refill request to provider for review/approval because:  Labs not current:  LDL  Patient needs to be seen because it has been more than 1 year since last office visit.    Last office visit provider:  1/21/21     Requested Prescriptions   Pending Prescriptions Disp Refills     atorvastatin (LIPITOR) 20 MG tablet [Pharmacy Med Name: ATORVASTATIN 20 MG TABLET] 90 tablet 3     Sig: TAKE 1 TABLET BY MOUTH EVERY DAY       Statins Protocol Failed - 2/2/2022  9:28 AM        Failed - LDL on file in past 12 months     Recent Labs   Lab Test 01/18/21  1158                Failed - Medication is active on med list        Passed - No abnormal creatine kinase in past 12 months     No lab results found.             Passed - Recent (12 mo) or future (30 days) visit within the authorizing provider's specialty     Patient has had an office visit with the authorizing provider or a provider within the authorizing providers department within the previous 12 mos or has a future within next 30 days. See \"Patient Info\" tab in inbasket, or \"Choose Columns\" in Meds & Orders section of the refill encounter.              Passed - Patient is age 18 or older             Rocael uDgan RN 02/04/22 12:40 PM  "

## 2022-02-15 ENCOUNTER — OFFICE VISIT (OUTPATIENT)
Dept: INTERNAL MEDICINE | Facility: CLINIC | Age: 67
End: 2022-02-15
Payer: COMMERCIAL

## 2022-02-15 VITALS
WEIGHT: 179 LBS | HEIGHT: 66 IN | SYSTOLIC BLOOD PRESSURE: 112 MMHG | DIASTOLIC BLOOD PRESSURE: 76 MMHG | BODY MASS INDEX: 28.77 KG/M2 | HEART RATE: 68 BPM | OXYGEN SATURATION: 98 %

## 2022-02-15 DIAGNOSIS — Z00.00 ROUTINE GENERAL MEDICAL EXAMINATION AT A HEALTH CARE FACILITY: Primary | ICD-10-CM

## 2022-02-15 LAB
ALBUMIN UR-MCNC: NEGATIVE MG/DL
APPEARANCE UR: CLEAR
BILIRUB UR QL STRIP: NEGATIVE
COLOR UR AUTO: YELLOW
GLUCOSE UR STRIP-MCNC: NEGATIVE MG/DL
HGB UR QL STRIP: NEGATIVE
KETONES UR STRIP-MCNC: NEGATIVE MG/DL
LEUKOCYTE ESTERASE UR QL STRIP: NEGATIVE
NITRATE UR QL: NEGATIVE
PH UR STRIP: 5 [PH] (ref 5–8)
SP GR UR STRIP: >=1.03 (ref 1–1.03)
UROBILINOGEN UR STRIP-ACNC: 0.2 E.U./DL

## 2022-02-15 PROCEDURE — 99396 PREV VISIT EST AGE 40-64: CPT | Performed by: INTERNAL MEDICINE

## 2022-02-15 PROCEDURE — 81003 URINALYSIS AUTO W/O SCOPE: CPT | Performed by: INTERNAL MEDICINE

## 2022-02-15 RX ORDER — TRIAMCINOLONE ACETONIDE 1 MG/G
OINTMENT TOPICAL 2 TIMES DAILY
Qty: 80 G | Refills: 11 | Status: SHIPPED | OUTPATIENT
Start: 2022-02-15

## 2022-02-15 RX ORDER — TRIAMCINOLONE ACETONIDE 0.25 MG/G
CREAM TOPICAL 2 TIMES DAILY
COMMUNITY
End: 2024-05-28

## 2022-02-15 ASSESSMENT — MIFFLIN-ST. JEOR: SCORE: 1536.75

## 2022-02-15 NOTE — PROGRESS NOTES
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    Colonoscopy done 2021 with [facility] showed focal active colitis plus hemorrhoids.  In  had a colonoscopy dated  showing an adenomatous polyp and diverticulosis he had hematochezia the reason for the redo of the colonoscopy in the interlude.    Allergies seafood penicillin with hives non-smoker no excess alcohol.  Has enjoyed snf for about 6 years.  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  Patient is adopted his biologic parents are both  mother  in her 90s Father is unknown.    ROS: A comprehensive review of systems was performed and was otherwise negative    Medications:   Prior to Admission medications    Medication Sig Start Date End Date Taking? Authorizing Provider   ASPIRIN PO Take 81 mg by mouth   Yes Reported, Patient   atorvastatin (LIPITOR) 20 MG tablet TAKE 1 TABLET BY MOUTH EVERY DAY 22  Yes Quincy Mora MD   triamcinolone (KENALOG) 0.025 % cream Apply topically 2 times daily Prn   Yes Reported, Patient   triamcinolone (KENALOG) 0.1 % external ointment Apply topically 2 times daily 2/15/22  Yes Quincy Mora MD   Fexofenadine HCl (ALLEGRA PO)     Reported, Patient       Allergies:  Allergies   Allergen Reactions     Seafood Anaphylaxis     Penicillins Other (See Comments)     childhood       Immunizations:   Immunization History   Administered Date(s) Administered     COVID-19,PF,Moderna 2021, 2021     Flu, Unspecified 2009     Influenza (IIV3) PF 2009     Influenza Quad, Recombinant, pf(RIV4) (Flublok) 2021     Td (Adult), Adsorbed 2006     Td,adult,historic,unspecified 2006     Tdap (Adacel,Boostrix) 2015        Health Maintenance: Immunizations reviewed and up-to-date the patient has had the COVID vaccine including the booster completing a 3 course that in October or 2021.    Past Medical History: Hyperlipidemia without target organ damage.    History of sciatica and BPH and rotator cuff injury.    Past Surgical History: Bilateral inguinal hernia repair .    [place] tooth extraction  no anesthetic complications.    Family History: Patient is adopted.  Mother  in her 90s 2 children daughter and son both well as is his wife.  Father was estranged from the family and the biologic status of the biologic father of this patient is unknown.    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    Exam Chest clear to auscultation and percussion.  Heart tones regular rhythm without murmur rub or gallop.  Abdomen soft nontender no organomegaly.  No peritoneal signs.  Extremities free of edema cyanosis or clubbing.  Neck veins nondistended no thyromegaly or scleral icterus noted, carotids full.  Skin warm and dry easily conversant good spirited.  Normal intelligence.  Neurologically intact no gross localizing findings.  Prostate was slightly enlarged rest of exam was negative there was a suggestion of a left groin hernia asymptomatic skin negative lymph negative neuro negative psych normal HEENT negative back straight no severe spine tenderness genital exam negative good pulse noted in all 4 extremities no carotid bruits.    Assessment and Plan  General medical examination at health care facility today check hemogram comprehensive metabolic profile urinalysis lipid panel PSA TSH.  Colonoscopy and COVID-19 vaccines up-to-date and have been administered.  Booster .    The following high BMI interventions were performed this visit: encouragement to exercise    Quincy Mora MD    Patient Active Problem List   Diagnosis     Benign Prostatic Hypertrophy     Atypical  Chest Pain     Penicillin Reaction     Answers for HPI/ROS submitted by the patient on 2/15/2022  Frequency of exercise:: 4-5 days/week  Getting at least 3 servings of Calcium per day:: Yes  Diet:: Breakfast skipped  Taking medications regularly:: Yes  Medication side effects:: None  Bi-annual eye exam:: Yes  Dental care twice a year:: Yes  Sleep apnea or symptoms of sleep apnea:: Daytime drowsiness  Additional concerns today:: Yes  Duration of exercise:: 30-45 minutes

## 2022-02-15 NOTE — LETTER
February 17, 2022      Donavon Stein  [address] [place] [address]        Dear ,    We are writing to inform you of your test results.    All clear and good       Resulted Orders   UA reflex to Microscopic and Culture   Result Value Ref Range    Color Urine Yellow Colorless, Straw, Light Yellow, Yellow    Appearance Urine Clear Clear    Glucose Urine Negative Negative mg/dL    Bilirubin Urine Negative Negative    Ketones Urine Negative Negative mg/dL    Specific Gravity Urine >=1.030 1.005 - 1.030    Blood Urine Negative Negative    pH Urine 5.0 5.0 - 8.0    Protein Albumin Urine Negative Negative mg/dL    Urobilinogen Urine 0.2 0.2, 1.0 E.U./dL    Nitrite Urine Negative Negative    Leukocyte Esterase Urine Negative Negative    Narrative    Microscopic not indicated       If you have any questions or concerns, please call the clinic at the number listed above.       Sincerely,      Quincy Mora MD

## 2022-02-15 NOTE — PROGRESS NOTES
"SUBJECTIVE:   CC: Donavon Stein is an 66 year old male who presents for preventative health visit.     }  Patient has been advised of split billing requirements and indicates understanding: Yes  Healthy Habits:   PHQ-2 Total Score: 0      {Outside tests to abstract? :910824}    {additional problems to add (Optional):109920}    Today's PHQ-2 Score:   PHQ-2 ( 1999 Pfizer) 2/15/2022   Q1: Little interest or pleasure in doing things 0   Q2: Feeling down, depressed or hopeless 0   PHQ-2 Score 0   Q1: Little interest or pleasure in doing things Not at all   Q2: Feeling down, depressed or hopeless Not at all   PHQ-2 Score 0       Abuse: Current or Past(Physical, Sexual or Emotional)- No  Do you feel safe in your environment? No        Social History     Tobacco Use     Smoking status: Never Smoker     Smokeless tobacco: Never Used   Substance Use Topics     Alcohol use: Yes     Comment: rare     {Rooming Staff- Complete this question if Prescreen response is not shown below for today's visit. If you drink alcohol do you typically have >3 drinks per day or >7 drinks per week? (Optional):396340}    Alcohol Use 2/15/2022   Prescreen: >3 drinks/day or >7 drinks/week? No   {add AUDIT responses (Optional) (A score of 7 for adult men is an indication of hazardous drinking; a score of 8 or more is an indication of an alcohol use disorder.  A score of 7 or more for adult women is an indication of hazardous drinking or an alchohol use disorder):352121}    Last PSA:   Prostate Specific Antigen Screen   Date Value Ref Range Status   01/18/2021 1.5 0.0 - 4.5 ng/mL Final       Reviewed orders with patient. Reviewed health maintenance and updated orders accordingly - { :415403::\"Yes\"}  {Chronicprobdata (optional):724069}    Reviewed and updated as needed this visit by clinical staff  Tobacco  Allergies  Meds             Reviewed and updated as needed this visit by Provider               {HISTORY OPTIONS (Optional):071549}    Review of " "Systems  {MALE ROS (Optional):770984::\"CONSTITUTIONAL: NEGATIVE for fever, chills, change in weight\",\"INTEGUMENTARY/SKIN: NEGATIVE for worrisome rashes, moles or lesions\",\"EYES: NEGATIVE for vision changes or irritation\",\"ENT: NEGATIVE for ear, mouth and throat problems\",\"RESP: NEGATIVE for significant cough or SOB\",\"CV: NEGATIVE for chest pain, palpitations or peripheral edema\",\"GI: NEGATIVE for nausea, abdominal pain, heartburn, or change in bowel habits\",\" male: negative for dysuria, hematuria, decreased urinary stream, erectile dysfunction, urethral discharge\",\"MUSCULOSKELETAL: NEGATIVE for significant arthralgias or myalgia\",\"NEURO: NEGATIVE for weakness, dizziness or paresthesias\",\"PSYCHIATRIC: NEGATIVE for changes in mood or affect\"}    OBJECTIVE:   /76 (BP Location: Right arm, Patient Position: Sitting)   Pulse 68   Ht 1.664 m (5' 5.5\")   Wt 81.2 kg (179 lb)   SpO2 98%   BMI 29.33 kg/m      Physical Exam  {Exam Choices (Optional):314823}    {Diagnostic Test Results (Optional):661571::\"Diagnostic Test Results:\",\"Labs reviewed in Epic\"}    ASSESSMENT/PLAN:   {Diag Picklist:350907}    {Patient advised of split billing (Optional):343483}    COUNSELING:   {MALE COUNSELING MESSAGES:793985::\"Reviewed preventive health counseling, as reflected in patient instructions\"}    Estimated body mass index is 29.33 kg/m  as calculated from the following:    Height as of this encounter: 1.664 m (5' 5.5\").    Weight as of this encounter: 81.2 kg (179 lb).     {Weight Management Plan (ACO) Complete if BMI is abnormal-  Ages 18-64  BMI >24.9.  Age 65+ with BMI <23 or >30 (Optional):702373}    He reports that he has never smoked. He has never used smokeless tobacco.      Counseling Resources:  ATP IV Guidelines  Pooled Cohorts Equation Calculator  FRAX Risk Assessment  ICSI Preventive Guidelines  Dietary Guidelines for Americans, 2010  USDA's MyPlate  ASA Prophylaxis  Lung CA Screening    Quincy Mora MD  [facility]

## 2022-04-10 ENCOUNTER — HEALTH MAINTENANCE LETTER (OUTPATIENT)
Age: 67
End: 2022-04-10

## 2022-07-15 ENCOUNTER — MEDICAL CORRESPONDENCE (OUTPATIENT)
Dept: HEALTH INFORMATION MANAGEMENT | Facility: CLINIC | Age: 67
End: 2022-07-15

## 2022-07-15 ENCOUNTER — TELEPHONE (OUTPATIENT)
Dept: INTERNAL MEDICINE | Facility: CLINIC | Age: 67
End: 2022-07-15

## 2022-07-15 NOTE — TELEPHONE ENCOUNTER
Patient calling to update the provider he will be adding recent vaccines he has done outside of the [facility] so his [employer] can reflect those updated shots.    He will uploaded his recent shots to [employer]     Call Back if he needs to bring the original copies in or mail them to us.    [phone]

## 2022-10-16 ENCOUNTER — HEALTH MAINTENANCE LETTER (OUTPATIENT)
Age: 67
End: 2022-10-16

## 2022-12-22 ENCOUNTER — VIRTUAL VISIT (OUTPATIENT)
Dept: FAMILY MEDICINE | Facility: CLINIC | Age: 67
End: 2022-12-22
Payer: COMMERCIAL

## 2022-12-22 DIAGNOSIS — U07.1 INFECTION DUE TO 2019 NOVEL CORONAVIRUS: Primary | ICD-10-CM

## 2022-12-22 PROCEDURE — 99213 OFFICE O/P EST LOW 20 MIN: CPT | Mod: CS | Performed by: INTERNAL MEDICINE

## 2022-12-22 RX ORDER — NIRMATRELVIR AND RITONAVIR 300-100 MG
3 KIT ORAL 2 TIMES DAILY
Qty: 30 EACH | Refills: 0 | Status: SHIPPED | OUTPATIENT
Start: 2022-12-22 | End: 2023-01-23

## 2022-12-22 NOTE — PROGRESS NOTES
Donavon is a 67 year old who is being evaluated via a billable video visit.      How would you like to obtain your AVS? [employer]  If the video visit is dropped, the invitation should be resent by: Other e-mail: [email or web address]   Will anyone else be joining your video visit? No        Assessment & Plan     Donavon was seen today for covid concern.    Diagnoses and all orders for this visit:    Infection due to 2019 novel coronavirus  -     nirmatrelvir and ritonavir (PAXLOVID, 300/100,) therapy pack; Take 3 tablets by mouth 2 times daily    Hold Lipitor while on this medication so for total of 8 days        MASSBP score:   Patient qualifies for COVID-19 treatment intervention.  Appropriate counseling was performed given EUA of drug and investigational phase/limited studies.   Full discussion with patient regarding medication options/risks/benefits/common side effects/potential drug interactions.  Discussed Paxlovid has significant risk for drug interactions. We reviewed all prescription/OTC/supplements patient is taking and patient was asked to HOLD the following:    Avoid alcohol while on paxlovid (and for three days after last dose) due to increased risk of liver inflammation/jaundice.  Patient confirms no known HIV diagnosis and understands Paxlovid may lead to complications if uncontrolled/undiagnosed HIV.    Counseling provided on contraceptive management:  Male on Molnupiravir-if sexually active with females of childbearing age, should use reliable method of contraception during treatment and for three months after last dose of molnupiravir  Female on Molnupiravir: if childbearing age, should use reliable contraception during treatment and for 4 days after last dose of molnupiravir  Female on Molnupiravir: cannot breastfeed while on treatment and for 4 days after treatment  Paxlovid may reduce the efficacy of combined hormonal contraceptives and women should use an additional contraceptive method while on medication and for  "three days after last dose.  Please call the pharmacy prior to getting there to ensure they have your medication in stock and so they can review the medication in more detail with you.   Call if any questions/concerns.   If worsening symptoms including but not limited to persistent shortness of breath or chest pain, patient instructed to go to emergency room.   6}     BMI:   Estimated body mass index is 29.33 kg/m  as calculated from the following:    Height as of 2/15/22: 1.664 m (5' 5.5\").    Weight as of 2/15/22: 81.2 kg (179 lb).       See Patient Instructions    Return in about 1 month (around 1/22/2023) for wellness visit.    Pooja Sen MD  [facility] LIANA Raphael is a 67 year old, presenting for the following health issues:  Covid Concern      HPI       COVID-19 Symptom Review  How many days ago did these symptoms start? 4    Are any of the following symptoms significant for you?    New or worsening difficulty breathing? No    Worsening cough? Yes, I am coughing up mucus.    Fever or chills? Yes, I felt feverish or had chills.    Headache: YES    Sore throat: YES    Chest pain: No    Diarrhea: No    Body aches? YES    What treatments has patient tried? Acetaminophen   Does patient live in a nursing home, group home, or shelter? No  Does patient have a way to get food/medications during quarantined? Yes, I have a friend or family member who can help me.  Patient tested positive yesterday December 21  Symptoms a started on Monday, December 19          Review of Systems   Constitutional, HEENT, cardiovascular, pulmonary, GI, , musculoskeletal, neuro, skin, endocrine and psych systems are negative, except as otherwise noted.      Objective    Vitals - Patient Reported  Pain Score: Moderate Pain (5)  Pain Loc: Head      Vitals:  No vitals were obtained today due to virtual visit.    Physical Exam   GENERAL: Healthy, alert and no distress  EYES: Eyes grossly normal to " inspection.  No discharge or erythema, or obvious scleral/conjunctival abnormalities.  RESP: No audible wheeze, cough, or visible cyanosis.  No visible retractions or increased work of breathing.    SKIN: Visible skin clear. No significant rash, abnormal pigmentation or lesions.  NEURO: Cranial nerves grossly intact.  Mentation and speech appropriate for age.  PSYCH: Mentation appears normal, affect normal/bright, judgement and insight intact, normal speech and appearance well-groomed.          Disclaimer: This note consists of symbols derived from keyboarding, dictation and/or voice recognition software. As a result, there may be errors in the script that have gone undetected. Please consider this when interpreting information found in this chart.      Video-Visit Details    Type of service:  Video Visit      Video start time: 9:38 am  Video end time :9:53 am      Originating Location (pt. Location): Home  Distant Location (provider location):  Off-site  Platform used for Video Visit: Garrett

## 2022-12-22 NOTE — PATIENT INSTRUCTIONS
Hold Lipitor while you are on Paxlovid    The FDA has authorized the emergency use of certain medications for patients who are at high risk for progression to severe illness or death from COVID 19. These medications are investigational, and therefore, information is limited as they are still being studied.        COVID-19 Outpatient Treatments    Important: You can NOT be prescribed Molnupiravir or PAXLOVID if you will start taking the medicine more than 5 days after your symptoms have started.    Molnupiravir: https://www.fda.gov/media/148339/download  PAXLOVID: https://www.fda.gov/media/356161/download        PAXLOVID (nimatrelvir/ritonavir)  How it works  Two medicines (nirmatrelvir and ritonavir) are taken together. They stop the virus from growing. Less amount of virus is easier for your body to fight.  Benefits  Lowers risk of hospitalization and death from COVID-19.  How to take  Medicine comes in a daily container with both medicine tablets. Take by mouth twice daily (once in the morning, once at night) for 5 days.  The number of tablets to take varies by patient.  Don't chew or break capsules. Swallow hole.  When to take  Take as soon as possible after positive COVID-19 test result, and within 5 days of your first symptoms.  Who can take it  Patients must be 18 years or older, weigh at least 88 pounds and have tested positive for COVID-19.  Possible side effects  Can cause altered sense of taste, diarhea (loose, watery stools), high blood pressure, muscle aches.  Medication interactions  Several medicines may interact with PAXLOVID and may cause serious side effects.  Tell your care team about all the medicines you take, including prescription and over-the-counter medicines, vitamins and herbal supplements.  Your provider will review your medicines to make sure that you can safely take PAXLOVID.  Cautions  PAXLOVID is not recommended for patients with severe kidney or liver disease. If you have mild kidney  disease, the dose may need to be changed.  If you are pregnant or breastfeeding, talk to your care team about your options.  If you are sexually active, use effective birth control while taking PAXLOVID.      For symptomatic immunocompetent patients with mild disease who are cared for at home, isolation can usually be discontinued when the following criteria are met   ?At least five days have passed since symptoms first appeared (day 0 is the date of symptom onset) AND  ?At least one day (24 hours) has passed since resolution of fever without the use of fever-reducing medications AND   ?There is improvement in symptoms (eg, cough, shortness of breath)  After discontinuing home isolation, patients should continue to wear a well-fitting mask around others. The total duration of isolation plus strict masking is typically 10 days

## 2023-01-26 DIAGNOSIS — E78.5 HYPERLIPIDEMIA, UNSPECIFIED: ICD-10-CM

## 2023-01-27 RX ORDER — ATORVASTATIN CALCIUM 20 MG/1
TABLET, FILM COATED ORAL
Qty: 90 TABLET | Refills: 0 | Status: SHIPPED | OUTPATIENT
Start: 2023-01-27 | End: 2023-04-29

## 2023-01-27 NOTE — TELEPHONE ENCOUNTER
"Last Written Prescription Date:  2/4/22  Last Fill Quantity: 90,  # refills: 3   Last office visit provider:  2/15/22     Requested Prescriptions   Pending Prescriptions Disp Refills     atorvastatin (LIPITOR) 20 MG tablet [Pharmacy Med Name: ATORVASTATIN 20 MG TABLET] 90 tablet 3     Sig: TAKE 1 TABLET BY MOUTH EVERY DAY       Statins Protocol Passed - 1/26/2023 11:36 AM        Passed - LDL on file in past 12 months     Recent Labs   Lab Test 02/15/22  1139                Passed - No abnormal creatine kinase in past 12 months     No lab results found.             Passed - Recent (12 mo) or future (30 days) visit within the authorizing provider's specialty     Patient has had an office visit with the authorizing provider or a provider within the authorizing providers department within the previous 12 mos or has a future within next 30 days. See \"Patient Info\" tab in inbasket, or \"Choose Columns\" in Meds & Orders section of the refill encounter.              Passed - Medication is active on med list        Passed - Patient is age 18 or older             Sofia Sullivan RN 01/27/23 2:26 PM  "

## 2023-02-21 ASSESSMENT — ENCOUNTER SYMPTOMS
FREQUENCY: 1
ARTHRALGIAS: 0
DIARRHEA: 0
CONSTIPATION: 0
EYE PAIN: 0
HEARTBURN: 1
PALPITATIONS: 0
NERVOUS/ANXIOUS: 0
SHORTNESS OF BREATH: 0
ABDOMINAL PAIN: 0
WEAKNESS: 0
FEVER: 0
COUGH: 0
HEMATOCHEZIA: 1
HEADACHES: 0
CHILLS: 0
DYSURIA: 0
JOINT SWELLING: 0
DIZZINESS: 0
SORE THROAT: 0
NAUSEA: 0
HEMATURIA: 0
MYALGIAS: 0
PARESTHESIAS: 0

## 2023-02-21 ASSESSMENT — ACTIVITIES OF DAILY LIVING (ADL): CURRENT_FUNCTION: NO ASSISTANCE NEEDED

## 2023-02-28 ENCOUNTER — OFFICE VISIT (OUTPATIENT)
Dept: INTERNAL MEDICINE | Facility: CLINIC | Age: 68
End: 2023-02-28
Payer: COMMERCIAL

## 2023-02-28 VITALS
BODY MASS INDEX: 30.57 KG/M2 | OXYGEN SATURATION: 98 % | HEIGHT: 65 IN | WEIGHT: 183.5 LBS | RESPIRATION RATE: 16 BRPM | DIASTOLIC BLOOD PRESSURE: 80 MMHG | TEMPERATURE: 98.3 F | HEART RATE: 61 BPM | SYSTOLIC BLOOD PRESSURE: 110 MMHG

## 2023-02-28 DIAGNOSIS — Z00.00 ROUTINE GENERAL MEDICAL EXAMINATION AT A HEALTH CARE FACILITY: Primary | ICD-10-CM

## 2023-02-28 DIAGNOSIS — Z12.5 SCREENING FOR PROSTATE CANCER: ICD-10-CM

## 2023-02-28 LAB
ALBUMIN SERPL BCG-MCNC: 4.1 G/DL (ref 3.5–5.2)
ALBUMIN UR-MCNC: NEGATIVE MG/DL
ALP SERPL-CCNC: 115 U/L (ref 40–129)
ALT SERPL W P-5'-P-CCNC: 24 U/L (ref 10–50)
ANION GAP SERPL CALCULATED.3IONS-SCNC: 9 MMOL/L (ref 7–15)
APPEARANCE UR: CLEAR
AST SERPL W P-5'-P-CCNC: 29 U/L (ref 10–50)
BILIRUB SERPL-MCNC: 0.7 MG/DL
BILIRUB UR QL STRIP: NEGATIVE
BUN SERPL-MCNC: 18 MG/DL (ref 8–23)
CALCIUM SERPL-MCNC: 9.2 MG/DL (ref 8.8–10.2)
CHLORIDE SERPL-SCNC: 103 MMOL/L (ref 98–107)
CHOLEST SERPL-MCNC: 177 MG/DL
COLOR UR AUTO: YELLOW
CREAT SERPL-MCNC: 1.01 MG/DL (ref 0.67–1.17)
DEPRECATED HCO3 PLAS-SCNC: 26 MMOL/L (ref 22–29)
ERYTHROCYTE [DISTWIDTH] IN BLOOD BY AUTOMATED COUNT: 12 % (ref 10–15)
GFR SERPL CREATININE-BSD FRML MDRD: 83 ML/MIN/1.73M2
GLUCOSE SERPL-MCNC: 102 MG/DL (ref 70–99)
GLUCOSE UR STRIP-MCNC: NEGATIVE MG/DL
HCT VFR BLD AUTO: 43 % (ref 40–53)
HDLC SERPL-MCNC: 41 MG/DL
HGB BLD-MCNC: 15 G/DL (ref 13.3–17.7)
HGB UR QL STRIP: NEGATIVE
KETONES UR STRIP-MCNC: NEGATIVE MG/DL
LDLC SERPL CALC-MCNC: 103 MG/DL
LEUKOCYTE ESTERASE UR QL STRIP: NEGATIVE
MCH RBC QN AUTO: 32 PG (ref 26.5–33)
MCHC RBC AUTO-ENTMCNC: 34.9 G/DL (ref 31.5–36.5)
MCV RBC AUTO: 92 FL (ref 78–100)
NITRATE UR QL: NEGATIVE
NONHDLC SERPL-MCNC: 136 MG/DL
PH UR STRIP: 5 [PH] (ref 5–8)
PLATELET # BLD AUTO: 174 10E3/UL (ref 150–450)
POTASSIUM SERPL-SCNC: 4.2 MMOL/L (ref 3.4–5.3)
PROT SERPL-MCNC: 7 G/DL (ref 6.4–8.3)
PSA SERPL-MCNC: 1.56 NG/ML (ref 0–4.5)
RBC # BLD AUTO: 4.69 10E6/UL (ref 4.4–5.9)
SODIUM SERPL-SCNC: 138 MMOL/L (ref 136–145)
SP GR UR STRIP: 1.02 (ref 1–1.03)
TRIGL SERPL-MCNC: 167 MG/DL
TSH SERPL DL<=0.005 MIU/L-ACNC: 3.9 UIU/ML (ref 0.3–4.2)
UROBILINOGEN UR STRIP-ACNC: 0.2 E.U./DL
WBC # BLD AUTO: 4.2 10E3/UL (ref 4–11)

## 2023-02-28 PROCEDURE — 81003 URINALYSIS AUTO W/O SCOPE: CPT | Performed by: INTERNAL MEDICINE

## 2023-02-28 PROCEDURE — 84443 ASSAY THYROID STIM HORMONE: CPT | Performed by: INTERNAL MEDICINE

## 2023-02-28 PROCEDURE — 80053 COMPREHEN METABOLIC PANEL: CPT | Performed by: INTERNAL MEDICINE

## 2023-02-28 PROCEDURE — 99397 PER PM REEVAL EST PAT 65+ YR: CPT | Performed by: INTERNAL MEDICINE

## 2023-02-28 PROCEDURE — 85027 COMPLETE CBC AUTOMATED: CPT | Performed by: INTERNAL MEDICINE

## 2023-02-28 PROCEDURE — G0103 PSA SCREENING: HCPCS | Performed by: INTERNAL MEDICINE

## 2023-02-28 PROCEDURE — 80061 LIPID PANEL: CPT | Performed by: INTERNAL MEDICINE

## 2023-02-28 PROCEDURE — 36415 COLL VENOUS BLD VENIPUNCTURE: CPT | Performed by: INTERNAL MEDICINE

## 2023-02-28 RX ORDER — RIBOFLAVIN (VITAMIN B2) 100 MG
TABLET ORAL
COMMUNITY
Start: 2021-01-01

## 2023-02-28 RX ORDER — VITAMIN B COMPLEX
TABLET ORAL
COMMUNITY
Start: 2010-01-01

## 2023-02-28 RX ORDER — ZINC GLUCONATE 50 MG
TABLET ORAL
COMMUNITY
Start: 2022-01-01 | End: 2024-04-30

## 2023-02-28 NOTE — PROGRESS NOTES
Annual Wellness Visit:  Donavon Stein  is a 67 year old male  who presents for an annual wellness visit.  Annual wellness visit and physical examination we had a good discussion physician and patient sharing accomplished.  Today check hemogram comprehensive metabolic profile urinalysis lipid panel PSA TSH.    Advance care planning done.    Falls risk assessment also accomplished.    Cognitive assessment was completed and the current provider this examiner and patient sharing was also completed.  Assessment/Plan:  Annual wellness visit and physical exam screen for prostate cancer.    Subjective:   Medical History:    Mild dysphagia to some foods no associated weight loss.  Feels like something stuck offered esophagram upper GI endoscopy patient declined.    Rectal bleeding on occasion Metamucil emphasized.  Last colonoscopy allCLEAR October 2021.  External hemorrhoids seen on exam see below.  Not bleeding or friable.  Blood is only on paper as bright red.    No weight loss.  No smoking very light alcohol may be allergic to penicillin.    Bilateral hernia repair inguinal.  [place] tooth extraction.    No anesthetic complications.    History of hyperlipidemia.  Patient is adopted  Past Medical History:   Diagnosis Date     High cholesterol      Current Outpatient Medications   Medication     ASPIRIN PO     atorvastatin (LIPITOR) 20 MG tablet     triamcinolone (KENALOG) 0.025 % cream     triamcinolone (KENALOG) 0.1 % external ointment     vitamin C (ASCORBIC ACID) 100 MG tablet     Vitamin D3 (CHOLECALCIFEROL) 25 mcg (1000 units) tablet     zinc gluconate 50 MG tablet     No current facility-administered medications for this visit.     Immunization History   Administered Date(s) Administered     COVID-19 Vaccine 18+ (Moderna) 03/27/2021, 04/24/2021, 12/09/2021, 04/18/2022     Flu, Unspecified 11/06/2009     Influenza (IIV3) PF 11/06/2009     Influenza Vaccine 50-64 or 18-64 w/egg allergy (Flublok) 01/18/2021      "Pneumococcal 20 valent Conjugate (Prevnar 20) 2023     Td (Adult), Adsorbed 2006     Td,adult,historic,unspecified 2006     Tdap (Adacel,Boostrix) 2015     Zoster vaccine recombinant adjuvanted (SHINGRIX) 2022, 2022       Surgical History:  Past Surgical History:   Procedure Laterality Date     ABDOMEN SURGERY      inguinal hernia        Family History:  Patient is adopted biologic parents mother  in her late 80s uncertain cause Father is  within the last 3 months uncertain cause has connected with some of his siblings.  Family is otherwise well wife well.  Retired now 7 years from [employer].    Social History:  Retired 7 years from [employer].  Enjoys golfing and walking.  Tries to exercise.    Health Maintenances:  Has had COVID 3 times.  Has had 4 COVID vaccines.  Colonoscopy 2021 allCLEAR.    Objective:  /80 (BP Location: Right arm, Patient Position: Sitting, Cuff Size: Adult Regular)   Pulse 61   Temp 98.3  F (36.8  C) (Oral)   Resp 16   Ht 1.651 m (5' 5\")   Wt 83.2 kg (183 lb 8 oz)   SpO2 98%   BMI 30.54 kg/m    Chest clear heart tones normal abdomen benign mild centripetal obesity BMI is elevated at 31 extremities negative good pulse noted in all 4 extremities no carotid bruits thyromegaly skin lymph neuro negative back straight no severe spine tenderness patient describes sciatica right side on occasion.  Emphasize core strengthening exercises.    Quincy Mora MD    Internal Medicine  Answers for HPI/ROS submitted by the patient on 2023  In general, how would you rate your overall physical health?: excellent  Frequency of exercise:: 6-7 days/week  Do you usually eat at least 4 servings of fruit and vegetables a day, include whole grains & fiber, and avoid regularly eating high fat or \"junk\" foods? : Yes  Taking medications regularly:: Yes  Medication side effects:: Muscle aches  Activities of Daily Living: no assistance needed  Home safety: " no safety concerns identified  Hearing Impairment:: difficulty following a conversation in a noisy restaurant or crowded room, need to ask people to speak up or repeat themselves  In the past 6 months, have you been bothered by leaking of urine?: Yes  abdominal pain: No  Blood in stool: Yes  Blood in urine: No  chest pain: No  chills: No  congestion: No  constipation: No  cough: No  diarrhea: No  dizziness: No  ear pain: No  eye pain: No  nervous/anxious: No  fever: No  frequency: Yes  genital sores: No  headaches: No  hearing loss: Yes  heartburn: Yes  arthralgias: No  joint swelling: No  peripheral edema: No  mood changes: No  myalgias: No  nausea: No  dysuria: No  palpitations: No  Skin sensation changes: No  sore throat: No  urgency: No  rash: Yes  shortness of breath: No  visual disturbance: No  weakness: No  impotence: No  penile discharge: No  In general, how would you rate your overall mental or emotional health?: excellent  Additional concerns today:: Yes  Duration of exercise:: Greater than 60 minutes

## 2023-04-29 DIAGNOSIS — E78.5 HYPERLIPIDEMIA, UNSPECIFIED: ICD-10-CM

## 2023-04-29 RX ORDER — ATORVASTATIN CALCIUM 20 MG/1
TABLET, FILM COATED ORAL
Qty: 90 TABLET | Refills: 3 | Status: SHIPPED | OUTPATIENT
Start: 2023-04-29 | End: 2024-02-15

## 2023-04-30 NOTE — TELEPHONE ENCOUNTER
"  Last Written Prescription Date:  1/27/23  Last Fill Quantity: 90,  # refills: 0   Last office visit provider:   2/28/23    Requested Prescriptions   Pending Prescriptions Disp Refills     atorvastatin (LIPITOR) 20 MG tablet [Pharmacy Med Name: ATORVASTATIN 20 MG TABLET] 90 tablet 0     Sig: TAKE 1 TABLET BY MOUTH EVERY DAY       Statins Protocol Passed - 4/29/2023  7:39 AM        Passed - LDL on file in past 12 months     Recent Labs   Lab Test 02/28/23  0751   *             Passed - No abnormal creatine kinase in past 12 months     No lab results found.             Passed - Recent (12 mo) or future (30 days) visit within the authorizing provider's specialty     Patient has had an office visit with the authorizing provider or a provider within the authorizing providers department within the previous 12 mos or has a future within next 30 days. See \"Patient Info\" tab in inbasket, or \"Choose Columns\" in Meds & Orders section of the refill encounter.              Passed - Medication is active on med list        Passed - Patient is age 18 or older             Hailey Fung RN 04/29/23 9:17 PM  "

## 2024-02-15 DIAGNOSIS — E78.5 HYPERLIPIDEMIA, UNSPECIFIED: ICD-10-CM

## 2024-02-15 RX ORDER — ATORVASTATIN CALCIUM 20 MG/1
TABLET, FILM COATED ORAL
Qty: 90 TABLET | Refills: 3 | OUTPATIENT
Start: 2024-02-15

## 2024-02-15 RX ORDER — ATORVASTATIN CALCIUM 20 MG/1
20 TABLET, FILM COATED ORAL DAILY
Qty: 90 TABLET | Refills: 0 | Status: SHIPPED | OUTPATIENT
Start: 2024-02-15 | End: 2024-04-30

## 2024-03-05 ENCOUNTER — NURSE TRIAGE (OUTPATIENT)
Dept: NURSING | Facility: CLINIC | Age: 69
End: 2024-03-05

## 2024-03-05 NOTE — TELEPHONE ENCOUNTER
Nurse Triage SBAR    Is this a 2nd Level Triage? NO    Situation: Nausea, vomiting, diarrhea    Background: Patient had an appointment this morning for an annual physical, but vomited 10-15 times throughout the night and also has diarrhea. Reports he did not eat anything out of the ordinary yesterday. Started about 7 PM last night.     Assessment: vomited 10-15 times throughout the night and also has diarrhea. Hasn't eaten since dinner last evening. Patient has been drinking fluids throughout the night. Denies abdominal pain currently. Does not feel feverish. Last urinated less than 8 hours ago, last before bed. Patient is very alert and talkative.     Protocol Recommended Disposition:   Home Care    Recommendation: Because patient reports he has been keeping some water down and is remaining hydrated, patient should be able to manage symptoms at home.  Reviewed disposition and care advice. Encouraged caller to call back with any further questions, concerns, or new/worsening symptoms; caller agrees. No further questions or concerns at this time.       Christiana Aguirre RN on 3/5/2024 at 4:43 AM      Reason for Disposition   [1] SEVERE vomiting (e.g., 6 or more times/day, vomits everything) BUT [2] hydrated    Additional Information   Negative: Shock suspected (e.g., cold/pale/clammy skin, too weak to stand, low BP, rapid pulse)   Negative: Difficult to awaken or acting confused (e.g., disoriented, slurred speech)   Negative: Sounds like a life-threatening emergency to the triager   Negative: Vomiting occurs only while coughing   Negative: [1] Pregnant < 20 Weeks AND [2] nausea/vomiting began in early pregnancy (i.e., 4-8 weeks pregnant)   Negative: Chest pain   Negative: Headache is main symptom   Negative: Vomiting (or Nausea) in a cancer patient who is currently (or recently) receiving chemotherapy or radiation therapy, or cancer patient who has metastatic or end-stage cancer and is receiving palliative care    "Negative: [1] Vomiting AND [2] contains red blood or black (\"coffee ground\") material  (Exception: Few red streaks in vomit that only happened once.)   Negative: Severe pain in one eye   Negative: Recent head injury (within last 3 days)   Negative: Recent abdominal injury (within last 3 days)   Negative: [1] Insulin-dependent diabetes (Type I) AND [2] glucose > 400 mg/dl (22 mmol/l)   Negative: [1] Vomiting AND [2] hernia is more painful or swollen than usual   Negative: [1] SEVERE vomiting (e.g., 6 or more times/day) AND [2] present > 8 hours (Exception: Patient sounds well, is drinking liquids, does not sound dehydrated, and vomiting has lasted less than 24 hours.)   Negative: [1] MODERATE vomiting (e.g., 3 - 5 times/day) AND [2] age > 60 years   Negative: Severe headache (e.g., excruciating)  (Exception: Similar to previous migraines.)   Negative: High-risk adult (e.g., diabetes mellitus, brain tumor, V-P shunt, hernia)   Negative: [1] Drinking very little AND [2] dehydration suspected (e.g., no urine > 12 hours, very dry mouth, very lightheaded)   Negative: Patient sounds very sick or weak to the triager   Negative: [1] Vomiting AND [2] abdomen looks much more swollen than usual   Negative: [1] Vomiting AND [2] contains bile (green color)   Negative: [1] Constant abdominal pain AND [2] present > 2 hours   Negative: [1] Fever > 103 F (39.4 C) AND [2] not able to get the fever down using Fever Care Advice   Negative: [1] Fever > 101 F (38.3 C) AND [2] age > 60 years   Negative: [1] Fever > 100.0 F (37.8 C) AND [2] bedridden (e.g., CVA, chronic illness, recovering from surgery)   Negative: [1] Fever > 100.0 F (37.8 C) AND [2] weak immune system (e.g., HIV positive, cancer chemo, splenectomy, organ transplant, chronic steroids)   Negative: Taking any of the following medications: digoxin (Lanoxin), lithium, theophylline, phenytoin (Dilantin)   Negative: [1] MILD or MODERATE vomiting AND [2] present > 48 hours (2 " days) (Exception: Mild vomiting with associated diarrhea.)   Negative: Fever present > 3 days (72 hours)   Negative: Vomiting a prescription medication   Negative: [1] MILD vomiting with diarrhea AND [2] present > 5 days   Negative: Substance use (drug use) or unhealthy alcohol use, known or suspected   Negative: Vomiting is a chronic symptom (recurrent or ongoing AND present > 4 weeks)    Protocols used: Vomiting-A-AH

## 2024-04-30 DIAGNOSIS — I10 ESSENTIAL HYPERTENSION: Primary | ICD-10-CM

## 2024-04-30 RX ORDER — ATORVASTATIN CALCIUM 20 MG/1
20 TABLET, FILM COATED ORAL DAILY
Qty: 90 TABLET | Refills: 0 | Status: SHIPPED | OUTPATIENT
Start: 2024-04-30 | End: 2024-05-28

## 2024-05-23 SDOH — HEALTH STABILITY: PHYSICAL HEALTH: ON AVERAGE, HOW MANY DAYS PER WEEK DO YOU ENGAGE IN MODERATE TO STRENUOUS EXERCISE (LIKE A BRISK WALK)?: 6 DAYS

## 2024-05-23 SDOH — HEALTH STABILITY: PHYSICAL HEALTH: ON AVERAGE, HOW MANY MINUTES DO YOU ENGAGE IN EXERCISE AT THIS LEVEL?: 50 MIN

## 2024-05-23 ASSESSMENT — SOCIAL DETERMINANTS OF HEALTH (SDOH): HOW OFTEN DO YOU GET TOGETHER WITH FRIENDS OR RELATIVES?: MORE THAN THREE TIMES A WEEK

## 2024-05-28 ENCOUNTER — OFFICE VISIT (OUTPATIENT)
Dept: INTERNAL MEDICINE | Facility: CLINIC | Age: 69
End: 2024-05-28
Payer: COMMERCIAL

## 2024-05-28 VITALS
SYSTOLIC BLOOD PRESSURE: 112 MMHG | DIASTOLIC BLOOD PRESSURE: 66 MMHG | BODY MASS INDEX: 30.32 KG/M2 | RESPIRATION RATE: 16 BRPM | HEART RATE: 57 BPM | WEIGHT: 182 LBS | HEIGHT: 65 IN | TEMPERATURE: 97.8 F | OXYGEN SATURATION: 97 %

## 2024-05-28 DIAGNOSIS — Z11.59 NEED FOR HEPATITIS C SCREENING TEST: ICD-10-CM

## 2024-05-28 DIAGNOSIS — I10 ESSENTIAL HYPERTENSION: ICD-10-CM

## 2024-05-28 DIAGNOSIS — Z00.00 ROUTINE GENERAL MEDICAL EXAMINATION AT A HEALTH CARE FACILITY: Primary | ICD-10-CM

## 2024-05-28 DIAGNOSIS — Z12.5 SCREENING FOR PROSTATE CANCER: ICD-10-CM

## 2024-05-28 DIAGNOSIS — R13.10 DYSPHAGIA, UNSPECIFIED TYPE: ICD-10-CM

## 2024-05-28 LAB
ALBUMIN SERPL BCG-MCNC: 4.3 G/DL (ref 3.5–5.2)
ALBUMIN UR-MCNC: NEGATIVE MG/DL
ALP SERPL-CCNC: 126 U/L (ref 40–150)
ALT SERPL W P-5'-P-CCNC: 19 U/L (ref 0–70)
ANION GAP SERPL CALCULATED.3IONS-SCNC: 9 MMOL/L (ref 7–15)
APPEARANCE UR: CLEAR
AST SERPL W P-5'-P-CCNC: 25 U/L (ref 0–45)
BILIRUB SERPL-MCNC: 0.6 MG/DL
BILIRUB UR QL STRIP: NEGATIVE
BUN SERPL-MCNC: 16.5 MG/DL (ref 8–23)
CALCIUM SERPL-MCNC: 9.4 MG/DL (ref 8.8–10.2)
CHLORIDE SERPL-SCNC: 104 MMOL/L (ref 98–107)
CHOLEST SERPL-MCNC: 180 MG/DL
COLOR UR AUTO: YELLOW
CREAT SERPL-MCNC: 1.06 MG/DL (ref 0.67–1.17)
DEPRECATED HCO3 PLAS-SCNC: 26 MMOL/L (ref 22–29)
EGFRCR SERPLBLD CKD-EPI 2021: 77 ML/MIN/1.73M2
ERYTHROCYTE [DISTWIDTH] IN BLOOD BY AUTOMATED COUNT: 11.8 % (ref 10–15)
FASTING STATUS PATIENT QL REPORTED: YES
FASTING STATUS PATIENT QL REPORTED: YES
GLUCOSE SERPL-MCNC: 104 MG/DL (ref 70–99)
GLUCOSE UR STRIP-MCNC: NEGATIVE MG/DL
HCT VFR BLD AUTO: 45.3 % (ref 40–53)
HDLC SERPL-MCNC: 41 MG/DL
HGB BLD-MCNC: 15.6 G/DL (ref 13.3–17.7)
HGB UR QL STRIP: NEGATIVE
KETONES UR STRIP-MCNC: NEGATIVE MG/DL
LDLC SERPL CALC-MCNC: 100 MG/DL
LEUKOCYTE ESTERASE UR QL STRIP: NEGATIVE
MCH RBC QN AUTO: 31.9 PG (ref 26.5–33)
MCHC RBC AUTO-ENTMCNC: 34.4 G/DL (ref 31.5–36.5)
MCV RBC AUTO: 93 FL (ref 78–100)
NITRATE UR QL: NEGATIVE
NONHDLC SERPL-MCNC: 139 MG/DL
PH UR STRIP: 5.5 [PH] (ref 5–8)
PLATELET # BLD AUTO: 180 10E3/UL (ref 150–450)
POTASSIUM SERPL-SCNC: 4.1 MMOL/L (ref 3.4–5.3)
PROT SERPL-MCNC: 7.3 G/DL (ref 6.4–8.3)
PSA SERPL DL<=0.01 NG/ML-MCNC: 1.18 NG/ML (ref 0–4.5)
RBC # BLD AUTO: 4.89 10E6/UL (ref 4.4–5.9)
SODIUM SERPL-SCNC: 139 MMOL/L (ref 135–145)
SP GR UR STRIP: <=1.005 (ref 1–1.03)
TRIGL SERPL-MCNC: 194 MG/DL
TSH SERPL DL<=0.005 MIU/L-ACNC: 4.48 UIU/ML (ref 0.3–4.2)
UROBILINOGEN UR STRIP-ACNC: 0.2 E.U./DL
WBC # BLD AUTO: 4.5 10E3/UL (ref 4–11)

## 2024-05-28 PROCEDURE — 80053 COMPREHEN METABOLIC PANEL: CPT | Performed by: INTERNAL MEDICINE

## 2024-05-28 PROCEDURE — G0103 PSA SCREENING: HCPCS | Performed by: INTERNAL MEDICINE

## 2024-05-28 PROCEDURE — 84443 ASSAY THYROID STIM HORMONE: CPT | Performed by: INTERNAL MEDICINE

## 2024-05-28 PROCEDURE — 99213 OFFICE O/P EST LOW 20 MIN: CPT | Mod: 25 | Performed by: INTERNAL MEDICINE

## 2024-05-28 PROCEDURE — 80061 LIPID PANEL: CPT | Performed by: INTERNAL MEDICINE

## 2024-05-28 PROCEDURE — 81003 URINALYSIS AUTO W/O SCOPE: CPT | Performed by: INTERNAL MEDICINE

## 2024-05-28 PROCEDURE — 36415 COLL VENOUS BLD VENIPUNCTURE: CPT | Performed by: INTERNAL MEDICINE

## 2024-05-28 PROCEDURE — 99397 PER PM REEVAL EST PAT 65+ YR: CPT | Performed by: INTERNAL MEDICINE

## 2024-05-28 PROCEDURE — 85027 COMPLETE CBC AUTOMATED: CPT | Performed by: INTERNAL MEDICINE

## 2024-05-28 RX ORDER — ATORVASTATIN CALCIUM 20 MG/1
20 TABLET, FILM COATED ORAL DAILY
Qty: 90 TABLET | Refills: 11 | Status: SHIPPED | OUTPATIENT
Start: 2024-05-28

## 2024-05-28 NOTE — PROGRESS NOTES
Annual Wellness Visit:  Donavon Stein  is a 69 year old male  who presents for an annual wellness visit.  Annual wellness visit and physical examination.  Screen for prostate cancer PSA plus FANI.  Refill atorvastatin history of hyperlipidemia primary prevention.  Dysphagia without weight loss suggest EGD with Dr. Gege Mcbride or associate at [facility].  Frequency of colonoscopy suggested every 5 years.  History of adenomatous colon polyps.    Physician and patient sharing was accomplished today.  I do not prescribe this patient opioids and the patient does not receive opioids from any other provider individual.  The patient's provider list includes only this examiner KALIE FISCHER MD as his primary care general internist PCP.  Cognitive assessment was done the patient was able to remember 3 items without difficulty and draw the base of an analog clock.  Functional capacity ADLs and safety was assessed all clear and good emotional mental health normal.  Labs ordered today include the hemogram comprehensive metabolic profile lipid panel PSA TSH urinalysis.  We had a good discussion.    Advance care planning done.    Falls risk assessment also accomplished.    Cognitive assessment was completed and the current provider this examiner and patient sharing was also completed.  Assessment/Plan:  Annual wellness visit and physical examination.  Screen for prostate cancer PSA plus FANI.    Subjective:   Medical History:    Non-smoker    Half of beer every month.  Allergy penicillin shellfish and hayfever.  Seasonal allergies.    Bilateral hernia repair 1977 wisdom teeth extraction.  Primary prevention treatment for hyperlipidemia.  No history of MI or stroke.    Some issues with dysphagia without weight loss.  No heartburn.  Suggest EGD.  Adenomatous colon polyp seen previously.  Next colonoscopy due 2031 per chart but last colonoscopy 2021.  Suggest every 5 year follow-up.  Past Medical History:   Diagnosis Date    High cholesterol   "    Current Outpatient Medications   Medication Sig Dispense Refill    ASPIRIN PO Take 81 mg by mouth      atorvastatin (LIPITOR) 20 MG tablet Take 1 tablet (20 mg) by mouth daily 90 tablet 11    triamcinolone (KENALOG) 0.1 % external ointment Apply topically 2 times daily 80 g 11    vitamin C (ASCORBIC ACID) 100 MG tablet       Vitamin D3 (CHOLECALCIFEROL) 25 mcg (1000 units) tablet        No current facility-administered medications for this visit.     Immunization History   Administered Date(s) Administered    COVID-19 Monovalent 18+ (Moderna) 2021, 2021, 2021, 2022    Flu, Unspecified 2009    Influenza (IIV3) PF 2009    Influenza Vaccine 18-64 (Flublok) 2021    Pneumococcal 20 valent Conjugate (Prevnar 20) 2023    TDAP (Adacel,Boostrix) 2015    Td (Adult), Adsorbed 2006    Td,adult,historic,unspecified 2006    Zoster recombinant adjuvanted (SHINGRIX) 2022, 2022       Surgical History:  Past Surgical History:   Procedure Laterality Date    ABDOMEN SURGERY      inguinal hernia    BIOPSY      Colonial    COLONOSCOPY      7 exams    HERNIA REPAIR      Bilateral        Family History:  Patient is adopted the status of his biologic parents is scanty.  Both parents  in their late 80s.  2 children well wife well.  No grandchildren.    Social History:  Retired  for [employer]  [employer].    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  [employer].    Health Maintenances:  Immunizations and vaccines reviewed along with colonoscopy last allCLEAR .  History of adenomatous colon polyps.  Suggest every 5 year follow-up.  [facility] or colorectal surgery group.    Objective:  /66 (BP Location: Right arm, Patient Position: Sitting, Cuff Size: Adult Regular)   Pulse 57   Temp 97.8  F (36.6  C) (Oral)   Resp 16   Ht 1.662 m (5' 5.45\")   Wt 82.6 kg " (182 lb)   SpO2 97%   BMI 29.87 kg/m    Easily conversant good spirited intelligent not in acute distress or toxic he appears younger than his stated age his weight is increased his BMI is 30.  Discussed.    Skin negative lymph negative neuro negative chest clear heart tones normal abdomen benign genital rectal exam negative but the prostate was enlarged 1-2/4 without nodularity induration nothing to suggest prostatic malignancy the rest the rectal exam negative perianal disease showed mild hemorrhoids and there was a trace of blood during exam.  The testicular scrotal exam was negative bilaterally no groin hernias distal pulses were all good and strong in upper and lower extremities and symmetric.  There were no carotid bruits thyromegaly or thyroid nodules there is no lymphadenopathy appreciated lymph bearing areas we had a good examination and a good discussion today.    Quincy Mora MD    Internal Medicine

## 2024-06-07 ENCOUNTER — TRANSFERRED RECORDS (OUTPATIENT)
Dept: HEALTH INFORMATION MANAGEMENT | Facility: CLINIC | Age: 69
End: 2024-06-07
Payer: COMMERCIAL

## 2024-06-13 ENCOUNTER — VIRTUAL VISIT (OUTPATIENT)
Dept: INTERNAL MEDICINE | Facility: CLINIC | Age: 69
End: 2024-06-13
Payer: COMMERCIAL

## 2024-06-13 DIAGNOSIS — E03.9 HYPOTHYROIDISM, UNSPECIFIED TYPE: Primary | ICD-10-CM

## 2024-06-13 PROCEDURE — 99442 PR PHYSICIAN TELEPHONE EVALUATION 11-20 MIN: CPT | Mod: 93 | Performed by: INTERNAL MEDICINE

## 2024-06-13 RX ORDER — LEVOTHYROXINE SODIUM 25 UG/1
25 TABLET ORAL DAILY
Qty: 90 TABLET | Refills: 11 | Status: SHIPPED | OUTPATIENT
Start: 2024-06-13

## 2024-06-13 NOTE — PROGRESS NOTES
Donavon Stein is a 69 year oldwho is being evaluated via a billable telephone visit.       What phone number would you like to be contacted at? [phone]      Assessment & Plan  Hypothyroidism with fatigue and mild cold intolerance.  Minimal elevation in TSH.  Start levothyroxine 25 mcg daily discussed in detail.  15 minutes spent on the date of the encounter doing chart review, patient visit and documentation and I spoke with the patient directly 11 minutes.       Subjective  Mild cold intolerance and generalized fatigue.  No dry skin or constipation.  Questions regarding administration of levothyroxine with other medications.  Prior history of hyperlipidemia.  IBS on Metamucil.     Review of Systems   No blood in stool or urine med list reviewed reconciled in the chart denies chest pain or shortness of breath.  We had a good discussion.       Quincy Mora MD    The longitudinal plan of care for the diagnosis(es)/condition(s) as documented were addressed during this visit. Due to the added complexity in care, I will continue to support Donavon in the subsequent management and with ongoing continuity of care.

## 2024-07-19 ENCOUNTER — TRANSFERRED RECORDS (OUTPATIENT)
Dept: HEALTH INFORMATION MANAGEMENT | Facility: CLINIC | Age: 69
End: 2024-07-19
Payer: COMMERCIAL

## 2024-08-22 ENCOUNTER — TRANSFERRED RECORDS (OUTPATIENT)
Dept: HEALTH INFORMATION MANAGEMENT | Facility: CLINIC | Age: 69
End: 2024-08-22
Payer: COMMERCIAL

## 2024-11-01 ENCOUNTER — TRANSFERRED RECORDS (OUTPATIENT)
Dept: HEALTH INFORMATION MANAGEMENT | Facility: CLINIC | Age: 69
End: 2024-11-01
Payer: COMMERCIAL

## 2025-08-14 DIAGNOSIS — I10 ESSENTIAL HYPERTENSION: ICD-10-CM

## 2025-08-14 RX ORDER — ATORVASTATIN CALCIUM 20 MG/1
20 TABLET, FILM COATED ORAL DAILY
Qty: 90 TABLET | Refills: 11 | Status: SHIPPED | OUTPATIENT
Start: 2025-08-14

## 2025-08-31 DIAGNOSIS — E03.9 HYPOTHYROIDISM, UNSPECIFIED TYPE: ICD-10-CM

## 2025-09-02 ENCOUNTER — MYC REFILL (OUTPATIENT)
Dept: INTERNAL MEDICINE | Facility: CLINIC | Age: 70
End: 2025-09-02
Payer: COMMERCIAL

## 2025-09-02 DIAGNOSIS — E03.9 HYPOTHYROIDISM, UNSPECIFIED TYPE: ICD-10-CM

## 2025-09-02 RX ORDER — LEVOTHYROXINE SODIUM 25 UG/1
25 TABLET ORAL DAILY
Qty: 90 TABLET | Refills: 11 | OUTPATIENT
Start: 2025-09-02

## 2025-09-03 RX ORDER — LEVOTHYROXINE SODIUM 25 UG/1
25 TABLET ORAL DAILY
Qty: 90 TABLET | Refills: 11 | Status: SHIPPED | OUTPATIENT
Start: 2025-09-03